# Patient Record
Sex: MALE | Race: WHITE | NOT HISPANIC OR LATINO | Employment: OTHER | URBAN - METROPOLITAN AREA
[De-identification: names, ages, dates, MRNs, and addresses within clinical notes are randomized per-mention and may not be internally consistent; named-entity substitution may affect disease eponyms.]

---

## 2017-03-13 PROBLEM — N18.30 CKD STAGE 3 SECONDARY TO DIABETES: Status: ACTIVE | Noted: 2017-03-13

## 2017-03-13 PROBLEM — E11.22 CKD STAGE 3 SECONDARY TO DIABETES: Status: ACTIVE | Noted: 2017-03-13

## 2017-10-27 ENCOUNTER — OFFICE VISIT (OUTPATIENT)
Dept: PODIATRY | Facility: CLINIC | Age: 82
End: 2017-10-27
Payer: COMMERCIAL

## 2017-10-27 VITALS — HEIGHT: 68 IN | WEIGHT: 167.13 LBS | BODY MASS INDEX: 25.33 KG/M2

## 2017-10-27 DIAGNOSIS — M21.6X9 EQUINUS DEFORMITY OF FOOT: ICD-10-CM

## 2017-10-27 DIAGNOSIS — E11.22 TYPE 2 DIABETES MELLITUS WITH STAGE 3 CHRONIC KIDNEY DISEASE, WITHOUT LONG-TERM CURRENT USE OF INSULIN: Primary | ICD-10-CM

## 2017-10-27 DIAGNOSIS — N18.30 TYPE 2 DIABETES MELLITUS WITH STAGE 3 CHRONIC KIDNEY DISEASE, WITHOUT LONG-TERM CURRENT USE OF INSULIN: Primary | ICD-10-CM

## 2017-10-27 DIAGNOSIS — M72.2 PLANTAR FASCIITIS: ICD-10-CM

## 2017-10-27 PROCEDURE — 99999 PR PBB SHADOW E&M-NEW PATIENT-LVL III: CPT | Mod: PBBFAC,,, | Performed by: PODIATRIST

## 2017-10-27 PROCEDURE — 11721 DEBRIDE NAIL 6 OR MORE: CPT | Mod: S$GLB,,, | Performed by: PODIATRIST

## 2017-10-27 PROCEDURE — 99204 OFFICE O/P NEW MOD 45 MIN: CPT | Mod: 25,S$GLB,, | Performed by: PODIATRIST

## 2017-10-27 NOTE — PATIENT INSTRUCTIONS
- Perform stretching exercises, see handout for details, to address tightness of calf muscles.      - Recommend wearing supportive shoes or orthopedic sandals only.  Avoid barefoot walking, flip flops, and Crocs, as this will exacerbate current symptoms.      - Recommend wearing a pair of OTC insoles.    - Recommend icing the affected heel a minimum of 20 minutes daily.    - Avoid high impact activities such as squatting, stooping, and running as these activities will exacerbate symptoms.      - May consider applying a topical analgesic (Aspercream or Salonpas) to help with pain symptoms.

## 2017-10-27 NOTE — PROGRESS NOTES
Subjective:      Patient ID: Yayo Daniels is a 82 y.o. male.    Chief Complaint: Heel Pain (left) and Diabetes Mellitus      Yayo is a 82 y.o. male who presents to the clinic for evaluation and treatment of diabetic feet. Yayo has a past medical history of Abnormality of gait; Dermatophytosis of nail; Hypothyroidism; Memory loss; Other and unspecified hyperlipidemia; Pancytopenia; Type II or unspecified type diabetes mellitus without mention of complication, uncontrolled; and Vascular dementia, uncomplicated. The majority of the patient's past history was provided by his wife on account of his dementia.  States that he has been complaining of Lt. Heel pain for the past several weeks.  Describes pain as sharp and localized to the central portion of the heel.  Symptoms are exacerbated with prolonged weight bearing and alleviated with rest.  Has been wearing shoes with cushioned heel soles to offload the affected area with minimal improvement in symptoms.  Denies trauma to the affected heel.  Denies any additional pedal complaints.      PCP: MARYAM Patrick Jr, MD    Date Last Seen by PCP: 9/14/17    Hemoglobin A1C   Date Value Ref Range Status   09/07/2017 7.8 (H) 0.0 - 5.6 % Final     Comment:     Reference Interval:  5.0 - 5.6 Normal   5.7 - 6.4 High Risk   > 6.5 Diabetic    Hgb A1c results are standardized based on the (NGSP) National   Glycohemoglobin Standardization Program.    Hemoglobin A1C levels are related to mean serum/plasma glucose   during the preceding 2-3 months.        03/08/2017 7.6 (H) 0.0 - 5.6 % Final     Comment:     Reference Interval:  5.0 - 5.6 Normal   5.7 - 6.4 High Risk   > 6.5 Diabetic    Hgb A1c results are standardized based on the (NGSP) National   Glycohemoglobin Standardization Program.    Hemoglobin A1C levels are related to mean serum/plasma glucose   during the preceding 2-3 months.        09/02/2016 7.6 (H) 0.0 - 5.6 % Final     Comment:     Reference Interval:  5.0 -  5.6 Normal   5.7 - 6.4 High Risk   > 6.5 Diabetic    Hgb A1c results are standardized based on the (NGSP) National   Glycohemoglobin Standardization Program.    Hemoglobin A1C levels are related to mean serum/plasma glucose   during the preceding 2-3 months.                    Past Medical History:   Diagnosis Date    Abnormality of gait     Dermatophytosis of nail     Hypothyroidism     Memory loss     Other and unspecified hyperlipidemia     Pancytopenia     Type II or unspecified type diabetes mellitus without mention of complication, uncontrolled     Vascular dementia, uncomplicated     ateriosclerotic dementia       Past Surgical History:   Procedure Laterality Date    BONE MARROW BIOPSY      2004    lens implants       dr juárez 205    SQUAMOUS CELL CARCINOMA EXCISION      left cheek   2004       Family History   Problem Relation Age of Onset    Cancer Father        Social History     Social History    Marital status:      Spouse name: N/A    Number of children: N/A    Years of education: N/A     Social History Main Topics    Smoking status: Never Smoker    Smokeless tobacco: Never Used    Alcohol use No    Drug use: Unknown    Sexual activity: Not Currently     Other Topics Concern    None     Social History Narrative    None       Current Outpatient Prescriptions   Medication Sig Dispense Refill    aspirin (ECOTRIN) 81 MG EC tablet Take 81 mg by mouth once daily.      calcium-vitamin D3 (CALCIUM 500 + D) 500 mg(1,250mg) -200 unit per tablet Take 1 tablet by mouth once daily.      cetirizine (ZYRTEC) 5 MG tablet Take 5 mg by mouth once daily.      ergocalciferol (ERGOCALCIFEROL) 50,000 unit Cap Take 50,000 Units by mouth every 7 days.      galantamine (RAZADYNE ER) 16 MG 24 hr capsule Take 16 mg by mouth daily with breakfast.      levothyroxine (SYNTHROID) 75 MCG tablet TAKE 1 TABLET BY MOUTH ONCE DAILY 90 tablet 3    lorazepam (ATIVAN) 0.5 MG tablet TAKE 1 TABLET BY MOUTH  EVERY MORNING AND 1 TO 2 TABLETS BY MOUTH EVERY EVENING AS NEEDED FOR AGITATION 90 tablet 5    naproxen sodium (ANAPROX) 220 MG tablet Take 220 mg by mouth 2 (two) times daily with meals.      pravastatin (PRAVACHOL) 80 MG tablet Take 80 mg by mouth once daily. Pt takes 40 mg      quetiapine (SEROQUEL) 50 MG tablet Take 50 mg by mouth every evening.      SAXagliptin (ONGLYZA) 5 mg Tab tablet Take by mouth once daily.      FLUZONE HIGH-DOSE 2016-17, PF, 180 mcg/0.5 mL Syrg ADM 0.5ML IM UTD  0    FLUZONE HIGH-DOSE 2017-18, PF, 180 mcg/0.5 mL vaccine ADM 0.5ML IM UTD  0    TRUEPLUS LANCETS 33 gauge Misc USE TO TEST BLOOD GLUCOSE ONCE D  3    TRUETEST TEST STRIPS Strp USE TO TAKE BLOOD SUGAR DAILY 100 strip 11     No current facility-administered medications for this visit.        Review of patient's allergies indicates:   Allergen Reactions    Aricept [donepezil] Other (See Comments)    Glimepiride Other (See Comments)    Metformin Other (See Comments)    Namenda [memantine]     Pravachol [pravastatin] Other (See Comments)    Zocor [simvastatin] Other (See Comments)         Review of Systems   Constitution: Negative for chills and fever.   Cardiovascular: Negative for claudication and leg swelling.   Skin: Positive for color change and nail changes.   Musculoskeletal: Positive for myalgias. Negative for joint pain.   Neurological: Negative for numbness and paresthesias.   Psychiatric/Behavioral: Negative for altered mental status.           Objective:      Physical Exam   Constitutional: He is oriented to person, place, and time. He appears well-developed and well-nourished. No distress.   Eyes: Conjunctivae are normal.   Cardiovascular:   Pulses:       Dorsalis pedis pulses are 2+ on the right side, and 2+ on the left side.        Posterior tibial pulses are 2+ on the right side, and 2+ on the left side.   CFT <3 seconds bilateral.  Pedal hair growth present bilateral.   Varicosities noted bilateral.   Mild nonpitting edema noted bilateral.  Toes are warm to touch bilateral.     Musculoskeletal: He exhibits edema and tenderness.   Muscle strength 5/5 in all muscle groups bilateral.  No tenderness nor crepitation with ROM of foot/ankle joints bilateral.  Pain with palpation of the Lt. Plantar central heel.  (-) heel squeeze test bilateral.    Bilateral pes planus foot type.  Bilateral gastrocnemius equinus.   Neurological: He is alert and oriented to person, place, and time. He has normal strength. No sensory deficit. He displays Babinski's sign on the right side.   Protective sensation per Denair-Demarcus monofilament intact bilateral.    Vibratory sensation intact bilateral.    Light touch intact bilateral.   Skin: Skin is warm, dry and intact. Capillary refill takes less than 2 seconds. No abrasion, no bruising, no burn, no ecchymosis, no laceration, no lesion, no petechiae and no rash noted. He is not diaphoretic. No erythema. No pallor.   Pedal skin has age appropriate turgor, temperature, and texture bilateral.  Toenails x 10 appear thickened by 2 mm's, elongated by 4 mm's, and discolored with subungual debris.   Examination of the skin reveals no evidence of significant maceration, rashes, open lesions, suspicious appearing nevi or other concerning lesions.              Assessment:       Encounter Diagnoses   Name Primary?    Plantar fasciitis     Equinus deformity of foot     Type 2 diabetes mellitus with stage 3 chronic kidney disease, without long-term current use of insulin Yes         Plan:       Yayo was seen today for heel pain and diabetes mellitus.    Diagnoses and all orders for this visit:    Type 2 diabetes mellitus with stage 3 chronic kidney disease, without long-term current use of insulin    Plantar fasciitis    Equinus deformity of foot      I counseled the patient on his conditions, their implications and medical management.    - Discussed performing stretching exercises to address  equinus.      - Recommend wearing supportive shoes and to avoid barefoot walking, flip flops, and Crocs, as this will exacerbate current symptoms.      - Recommend wearing a pair of OTC insoles.  Given both verbal and written information regarding this.    - Recommend icing the affected heel a minimum of 20 minutes daily.    - Discussed avoidance of high impact activities such as squatting, stooping, and running as these activities will exacerbate symptoms.      - May consider applying a topical analgesic (Aspercream or Salonpas) to help with pain symptoms.      - Shoe inspection. Diabetic Foot Education. Patient reminded of the importance of good nutrition and blood sugar control to help prevent podiatric complications of diabetes. Patient instructed on proper foot hygeine. We discussed wearing proper shoe gear, daily foot inspections, never walking without protective shoe gear, never putting sharp instruments to feet    - With patient's permission, nails were aggressively reduced and debrided x 10 to their soft tissue attachment mechanically and with electric , removing all offending nail and debris. Patient relates relief following the procedure. He will continue to monitor the areas daily, inspect his feet, wear protective shoe gear when ambulatory, moisturizer to maintain skin integrity and follow in this office in approximately 12 months, sooner p.r.n.    Return in about 1 year (around 10/27/2018).    Lucho Portillo DPM

## 2018-11-14 PROBLEM — E03.9 HYPOTHYROIDISM: Status: ACTIVE | Noted: 2018-11-14

## 2018-11-14 PROBLEM — D61.818 PANCYTOPENIA: Status: ACTIVE | Noted: 2018-11-14

## 2019-01-01 ENCOUNTER — OUTSIDE PLACE OF SERVICE (OUTPATIENT)
Dept: FAMILY MEDICINE | Facility: CLINIC | Age: 84
End: 2019-01-01
Payer: COMMERCIAL

## 2019-01-01 ENCOUNTER — HOSPITAL ENCOUNTER (OUTPATIENT)
Facility: HOSPITAL | Age: 84
Discharge: OTHER INSTITUTION | End: 2019-08-20
Attending: EMERGENCY MEDICINE | Admitting: FAMILY MEDICINE
Payer: COMMERCIAL

## 2019-01-01 ENCOUNTER — HOSPITAL ENCOUNTER (EMERGENCY)
Facility: HOSPITAL | Age: 84
Discharge: HOME | End: 2019-07-04
Attending: SURGERY
Payer: COMMERCIAL

## 2019-01-01 VITALS
TEMPERATURE: 97 F | WEIGHT: 177.94 LBS | SYSTOLIC BLOOD PRESSURE: 120 MMHG | HEART RATE: 69 BPM | DIASTOLIC BLOOD PRESSURE: 73 MMHG | OXYGEN SATURATION: 94 % | BODY MASS INDEX: 26.97 KG/M2 | RESPIRATION RATE: 18 BRPM | HEIGHT: 68 IN

## 2019-01-01 VITALS
TEMPERATURE: 98 F | OXYGEN SATURATION: 97 % | HEART RATE: 78 BPM | SYSTOLIC BLOOD PRESSURE: 142 MMHG | DIASTOLIC BLOOD PRESSURE: 63 MMHG

## 2019-01-01 DIAGNOSIS — R11.10 VOMITING: ICD-10-CM

## 2019-01-01 DIAGNOSIS — K85.90 PANCREATITIS, ACUTE: ICD-10-CM

## 2019-01-01 DIAGNOSIS — K62.89 PROCTITIS: Primary | ICD-10-CM

## 2019-01-01 DIAGNOSIS — S60.811A ABRASION OF RIGHT WRIST, INITIAL ENCOUNTER: ICD-10-CM

## 2019-01-01 DIAGNOSIS — N18.30 CKD STAGE 3 SECONDARY TO DIABETES: Chronic | ICD-10-CM

## 2019-01-01 DIAGNOSIS — S61.412A LACERATION OF LEFT HAND, FOREIGN BODY PRESENCE UNSPECIFIED, INITIAL ENCOUNTER: Primary | ICD-10-CM

## 2019-01-01 DIAGNOSIS — K85.90 PANCREATITIS: ICD-10-CM

## 2019-01-01 DIAGNOSIS — E11.22 CKD STAGE 3 SECONDARY TO DIABETES: Chronic | ICD-10-CM
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
## 2019-01-01 DIAGNOSIS — S80.02XA CONTUSION OF LEFT KNEE, INITIAL ENCOUNTER: ICD-10-CM

## 2019-01-01 LAB
ALBUMIN SERPL BCP-MCNC: 4 G/DL (ref 3.5–5.2)
ALP SERPL-CCNC: 108 U/L (ref 55–135)
ALT SERPL W/O P-5'-P-CCNC: 16 U/L (ref 10–44)
ANION GAP SERPL CALC-SCNC: 10 MMOL/L (ref 8–16)
ANION GAP SERPL CALC-SCNC: 8 MMOL/L (ref 8–16)
ANION GAP SERPL CALC-SCNC: 9 MMOL/L (ref 8–16)
APTT BLDCRRT: 28.9 SEC (ref 21–32)
AST SERPL-CCNC: 15 U/L (ref 10–40)
BASOPHILS # BLD AUTO: 0.07 K/UL (ref 0–0.2)
BASOPHILS NFR BLD: 0.8 % (ref 0–1.9)
BILIRUB SERPL-MCNC: 0.7 MG/DL (ref 0.1–1)
BILIRUB UR QL STRIP: NEGATIVE
BNP SERPL-MCNC: 12 PG/ML (ref 0–99)
BUN SERPL-MCNC: 16 MG/DL (ref 8–23)
BUN SERPL-MCNC: 17 MG/DL (ref 8–23)
BUN SERPL-MCNC: 23 MG/DL (ref 8–23)
CALCIUM SERPL-MCNC: 8.6 MG/DL (ref 8.7–10.5)
CALCIUM SERPL-MCNC: 9.3 MG/DL (ref 8.7–10.5)
CALCIUM SERPL-MCNC: 9.4 MG/DL (ref 8.7–10.5)
CHLORIDE SERPL-SCNC: 102 MMOL/L (ref 95–110)
CHLORIDE SERPL-SCNC: 104 MMOL/L (ref 95–110)
CHLORIDE SERPL-SCNC: 98 MMOL/L (ref 95–110)
CLARITY UR: CLEAR
CO2 SERPL-SCNC: 25 MMOL/L (ref 23–29)
CO2 SERPL-SCNC: 27 MMOL/L (ref 23–29)
CO2 SERPL-SCNC: 29 MMOL/L (ref 23–29)
COLOR UR: YELLOW
CREAT SERPL-MCNC: 1.1 MG/DL (ref 0.5–1.4)
CREAT SERPL-MCNC: 1.3 MG/DL (ref 0.5–1.4)
CREAT SERPL-MCNC: 1.3 MG/DL (ref 0.5–1.4)
DIFFERENTIAL METHOD: ABNORMAL
EOSINOPHIL # BLD AUTO: 0.1 K/UL (ref 0–0.5)
EOSINOPHIL NFR BLD: 0.7 % (ref 0–8)
ERYTHROCYTE [DISTWIDTH] IN BLOOD BY AUTOMATED COUNT: 12.5 % (ref 11.5–14.5)
EST. GFR  (AFRICAN AMERICAN): 58 ML/MIN/1.73 M^2
EST. GFR  (AFRICAN AMERICAN): 58 ML/MIN/1.73 M^2
EST. GFR  (AFRICAN AMERICAN): >60 ML/MIN/1.73 M^2
EST. GFR  (NON AFRICAN AMERICAN): 50 ML/MIN/1.73 M^2
EST. GFR  (NON AFRICAN AMERICAN): 50 ML/MIN/1.73 M^2
EST. GFR  (NON AFRICAN AMERICAN): >60 ML/MIN/1.73 M^2
ESTIMATED AVG GLUCOSE: 240 MG/DL (ref 68–131)
GLUCOSE SERPL-MCNC: 181 MG/DL (ref 70–110)
GLUCOSE SERPL-MCNC: 188 MG/DL (ref 70–110)
GLUCOSE SERPL-MCNC: 212 MG/DL (ref 70–110)
GLUCOSE UR QL STRIP: ABNORMAL
HBA1C MFR BLD HPLC: 10 % (ref 4–5.6)
HCT VFR BLD AUTO: 43 % (ref 40–54)
HGB BLD-MCNC: 14.6 G/DL (ref 14–18)
HGB UR QL STRIP: NEGATIVE
INFLUENZA A, MOLECULAR: NEGATIVE
INFLUENZA B, MOLECULAR: NEGATIVE
INR PPP: 1 (ref 0.8–1.2)
KETONES UR QL STRIP: NEGATIVE
LACTATE SERPL-SCNC: 1.8 MMOL/L (ref 0.5–2.2)
LEUKOCYTE ESTERASE UR QL STRIP: NEGATIVE
LIPASE SERPL-CCNC: 255 U/L (ref 4–60)
LIPASE SERPL-CCNC: 64 U/L (ref 4–60)
LYMPHOCYTES # BLD AUTO: 0.6 K/UL (ref 1–4.8)
LYMPHOCYTES NFR BLD: 6.5 % (ref 18–48)
MCH RBC QN AUTO: 31.6 PG (ref 27–31)
MCHC RBC AUTO-ENTMCNC: 34 G/DL (ref 32–36)
MCV RBC AUTO: 93 FL (ref 82–98)
MONOCYTES # BLD AUTO: 0.6 K/UL (ref 0.3–1)
MONOCYTES NFR BLD: 7.1 % (ref 4–15)
NEUTROPHILS # BLD AUTO: 7.2 K/UL (ref 1.8–7.7)
NEUTROPHILS NFR BLD: 84.9 % (ref 38–73)
NITRITE UR QL STRIP: NEGATIVE
PH UR STRIP: 6 [PH] (ref 5–8)
PLATELET # BLD AUTO: 128 K/UL (ref 150–350)
PMV BLD AUTO: 9.9 FL (ref 9.2–12.9)
POCT GLUCOSE: 148 MG/DL (ref 70–110)
POCT GLUCOSE: 163 MG/DL (ref 70–110)
POCT GLUCOSE: 176 MG/DL (ref 70–110)
POCT GLUCOSE: 202 MG/DL (ref 70–110)
POCT GLUCOSE: 227 MG/DL (ref 70–110)
POCT GLUCOSE: 249 MG/DL (ref 70–110)
POCT GLUCOSE: 279 MG/DL (ref 70–110)
POTASSIUM SERPL-SCNC: 3.8 MMOL/L (ref 3.5–5.1)
POTASSIUM SERPL-SCNC: 4.2 MMOL/L (ref 3.5–5.1)
POTASSIUM SERPL-SCNC: 4.4 MMOL/L (ref 3.5–5.1)
PROT SERPL-MCNC: 6.9 G/DL (ref 6–8.4)
PROT UR QL STRIP: NEGATIVE
PROTHROMBIN TIME: 10.5 SEC (ref 9–12.5)
RBC # BLD AUTO: 4.62 M/UL (ref 4.6–6.2)
SODIUM SERPL-SCNC: 135 MMOL/L (ref 136–145)
SODIUM SERPL-SCNC: 138 MMOL/L (ref 136–145)
SODIUM SERPL-SCNC: 139 MMOL/L (ref 136–145)
SP GR UR STRIP: 1.02 (ref 1–1.03)
SPECIMEN SOURCE: NORMAL
TROPONIN I SERPL DL<=0.01 NG/ML-MCNC: <0.006 NG/ML (ref 0–0.03)
URN SPEC COLLECT METH UR: ABNORMAL
UROBILINOGEN UR STRIP-ACNC: NEGATIVE EU/DL
WBC # BLD AUTO: 8.51 K/UL (ref 3.9–12.7)

## 2019-01-01 PROCEDURE — 90715 TDAP VACCINE 7 YRS/> IM: CPT | Mod: ER | Performed by: SURGERY

## 2019-01-01 PROCEDURE — 97530 THERAPEUTIC ACTIVITIES: CPT

## 2019-01-01 PROCEDURE — 84484 ASSAY OF TROPONIN QUANT: CPT

## 2019-01-01 PROCEDURE — 93005 ELECTROCARDIOGRAM TRACING: CPT

## 2019-01-01 PROCEDURE — 85610 PROTHROMBIN TIME: CPT

## 2019-01-01 PROCEDURE — S5571 INSULIN DISPOS PEN 3 ML: HCPCS | Performed by: NURSE PRACTITIONER

## 2019-01-01 PROCEDURE — 25000003 PHARM REV CODE 250: Performed by: NURSE PRACTITIONER

## 2019-01-01 PROCEDURE — 63600175 PHARM REV CODE 636 W HCPCS: Performed by: NURSE PRACTITIONER

## 2019-01-01 PROCEDURE — 36415 COLL VENOUS BLD VENIPUNCTURE: CPT

## 2019-01-01 PROCEDURE — 12002 RPR S/N/AX/GEN/TRNK2.6-7.5CM: CPT | Mod: ER

## 2019-01-01 PROCEDURE — 99308 SBSQ NF CARE LOW MDM 20: CPT | Mod: ,,, | Performed by: FAMILY MEDICINE

## 2019-01-01 PROCEDURE — G0378 HOSPITAL OBSERVATION PER HR: HCPCS

## 2019-01-01 PROCEDURE — 82962 GLUCOSE BLOOD TEST: CPT

## 2019-01-01 PROCEDURE — 96372 THER/PROPH/DIAG INJ SC/IM: CPT | Mod: 59 | Performed by: EMERGENCY MEDICINE

## 2019-01-01 PROCEDURE — 97161 PT EVAL LOW COMPLEX 20 MIN: CPT

## 2019-01-01 PROCEDURE — 96361 HYDRATE IV INFUSION ADD-ON: CPT

## 2019-01-01 PROCEDURE — 99499 NO LOS: ICD-10-PCS | Mod: S$GLB,,, | Performed by: FAMILY MEDICINE

## 2019-01-01 PROCEDURE — 85730 THROMBOPLASTIN TIME PARTIAL: CPT

## 2019-01-01 PROCEDURE — 80048 BASIC METABOLIC PNL TOTAL CA: CPT

## 2019-01-01 PROCEDURE — 25000003 PHARM REV CODE 250: Performed by: FAMILY MEDICINE

## 2019-01-01 PROCEDURE — 83690 ASSAY OF LIPASE: CPT

## 2019-01-01 PROCEDURE — 96375 TX/PRO/DX INJ NEW DRUG ADDON: CPT | Performed by: EMERGENCY MEDICINE

## 2019-01-01 PROCEDURE — 63600175 PHARM REV CODE 636 W HCPCS: Mod: ER | Performed by: SURGERY

## 2019-01-01 PROCEDURE — 85025 COMPLETE CBC W/AUTO DIFF WBC: CPT

## 2019-01-01 PROCEDURE — 99307 SBSQ NF CARE SF MDM 10: CPT | Mod: ,,, | Performed by: FAMILY MEDICINE

## 2019-01-01 PROCEDURE — 99307 PR NURSING FAC CARE, SUBSEQ, IMPROVING: ICD-10-PCS | Mod: ,,, | Performed by: FAMILY MEDICINE

## 2019-01-01 PROCEDURE — 93010 EKG 12-LEAD: ICD-10-PCS | Mod: ,,, | Performed by: INTERNAL MEDICINE

## 2019-01-01 PROCEDURE — 63600175 PHARM REV CODE 636 W HCPCS: Performed by: EMERGENCY MEDICINE

## 2019-01-01 PROCEDURE — 87502 INFLUENZA DNA AMP PROBE: CPT

## 2019-01-01 PROCEDURE — 97116 GAIT TRAINING THERAPY: CPT

## 2019-01-01 PROCEDURE — 83605 ASSAY OF LACTIC ACID: CPT

## 2019-01-01 PROCEDURE — 25500020 PHARM REV CODE 255: Performed by: EMERGENCY MEDICINE

## 2019-01-01 PROCEDURE — 81003 URINALYSIS AUTO W/O SCOPE: CPT

## 2019-01-01 PROCEDURE — 93010 ELECTROCARDIOGRAM REPORT: CPT | Mod: ,,, | Performed by: INTERNAL MEDICINE

## 2019-01-01 PROCEDURE — 96374 THER/PROPH/DIAG INJ IV PUSH: CPT | Mod: 59

## 2019-01-01 PROCEDURE — 99308 PR NURSING FAC CARE, SUBSEQ, MINOR COMPLIC: ICD-10-PCS | Mod: ,,, | Performed by: FAMILY MEDICINE

## 2019-01-01 PROCEDURE — 25000003 PHARM REV CODE 250: Mod: ER | Performed by: FAMILY MEDICINE

## 2019-01-01 PROCEDURE — 83880 ASSAY OF NATRIURETIC PEPTIDE: CPT

## 2019-01-01 PROCEDURE — 99499 UNLISTED E&M SERVICE: CPT | Mod: S$GLB,,, | Performed by: FAMILY MEDICINE

## 2019-01-01 PROCEDURE — 90471 IMMUNIZATION ADMIN: CPT | Mod: ER | Performed by: SURGERY

## 2019-01-01 PROCEDURE — 99284 EMERGENCY DEPT VISIT MOD MDM: CPT | Mod: 25,ER

## 2019-01-01 PROCEDURE — 63600175 PHARM REV CODE 636 W HCPCS: Performed by: FAMILY MEDICINE

## 2019-01-01 PROCEDURE — 94761 N-INVAS EAR/PLS OXIMETRY MLT: CPT

## 2019-01-01 PROCEDURE — 80053 COMPREHEN METABOLIC PANEL: CPT

## 2019-01-01 PROCEDURE — 99285 EMERGENCY DEPT VISIT HI MDM: CPT | Mod: 25

## 2019-01-01 PROCEDURE — 83036 HEMOGLOBIN GLYCOSYLATED A1C: CPT

## 2019-01-01 RX ORDER — ACETAMINOPHEN 325 MG/1
650 TABLET ORAL EVERY 4 HOURS PRN
Status: DISCONTINUED | OUTPATIENT
Start: 2019-01-01 | End: 2019-01-01 | Stop reason: HOSPADM

## 2019-01-01 RX ORDER — SODIUM CHLORIDE 9 MG/ML
1000 INJECTION, SOLUTION INTRAVENOUS
Status: COMPLETED | OUTPATIENT
Start: 2019-01-01 | End: 2019-01-01

## 2019-01-01 RX ORDER — GUAIFENESIN 600 MG/1
1200 TABLET, EXTENDED RELEASE ORAL DAILY
Status: DISCONTINUED | OUTPATIENT
Start: 2019-01-01 | End: 2019-01-01 | Stop reason: HOSPADM

## 2019-01-01 RX ORDER — IBUPROFEN 200 MG
16 TABLET ORAL
Status: DISCONTINUED | OUTPATIENT
Start: 2019-01-01 | End: 2019-01-01 | Stop reason: HOSPADM

## 2019-01-01 RX ORDER — POLYETHYLENE GLYCOL 3350 17 G/17G
17 POWDER, FOR SOLUTION ORAL DAILY
Qty: 7 EACH | Refills: 0 | Status: SHIPPED | OUTPATIENT
Start: 2019-01-01 | End: 2019-01-01

## 2019-01-01 RX ORDER — IBUPROFEN 200 MG
24 TABLET ORAL
Status: DISCONTINUED | OUTPATIENT
Start: 2019-01-01 | End: 2019-01-01 | Stop reason: HOSPADM

## 2019-01-01 RX ORDER — FUROSEMIDE 20 MG/1
20 TABLET ORAL DAILY
COMMUNITY

## 2019-01-01 RX ORDER — QUETIAPINE FUMARATE 25 MG/1
75 TABLET, FILM COATED ORAL NIGHTLY
COMMUNITY

## 2019-01-01 RX ORDER — ONDANSETRON 8 MG/1
8 TABLET, ORALLY DISINTEGRATING ORAL EVERY 8 HOURS PRN
Status: DISCONTINUED | OUTPATIENT
Start: 2019-01-01 | End: 2019-01-01 | Stop reason: HOSPADM

## 2019-01-01 RX ORDER — INSULIN ASPART 100 [IU]/ML
1-10 INJECTION, SOLUTION INTRAVENOUS; SUBCUTANEOUS
Status: DISCONTINUED | OUTPATIENT
Start: 2019-01-01 | End: 2019-01-01 | Stop reason: HOSPADM

## 2019-01-01 RX ORDER — ZOLPIDEM TARTRATE 5 MG/1
5 TABLET ORAL NIGHTLY
COMMUNITY

## 2019-01-01 RX ORDER — MICONAZOLE NITRATE 2 %
POWDER (GRAM) TOPICAL 2 TIMES DAILY
Refills: 0 | COMMUNITY
Start: 2019-01-01

## 2019-01-01 RX ORDER — ONDANSETRON 2 MG/ML
4 INJECTION INTRAMUSCULAR; INTRAVENOUS EVERY 8 HOURS PRN
Status: DISCONTINUED | OUTPATIENT
Start: 2019-01-01 | End: 2019-01-01 | Stop reason: HOSPADM

## 2019-01-01 RX ORDER — AMOXICILLIN 250 MG
1 CAPSULE ORAL 2 TIMES DAILY
Status: DISCONTINUED | OUTPATIENT
Start: 2019-01-01 | End: 2019-01-01 | Stop reason: HOSPADM

## 2019-01-01 RX ORDER — QUETIAPINE FUMARATE 25 MG/1
50 TABLET, FILM COATED ORAL DAILY
COMMUNITY

## 2019-01-01 RX ORDER — MEMANTINE HYDROCHLORIDE 5 MG/1
5 TABLET ORAL 2 TIMES DAILY
COMMUNITY

## 2019-01-01 RX ORDER — PRAVASTATIN SODIUM 40 MG/1
40 TABLET ORAL DAILY
COMMUNITY

## 2019-01-01 RX ORDER — MICONAZOLE NITRATE 2 %
POWDER (GRAM) TOPICAL 2 TIMES DAILY
Status: DISCONTINUED | OUTPATIENT
Start: 2019-01-01 | End: 2019-01-01 | Stop reason: HOSPADM

## 2019-01-01 RX ORDER — CEPHALEXIN 500 MG/1
500 CAPSULE ORAL EVERY 8 HOURS
Qty: 30 CAPSULE | Refills: 0 | Status: SHIPPED | OUTPATIENT
Start: 2019-01-01 | End: 2019-01-01 | Stop reason: SDUPTHER

## 2019-01-01 RX ORDER — POLYETHYLENE GLYCOL 3350 17 G/17G
17 POWDER, FOR SOLUTION ORAL DAILY
Status: DISCONTINUED | OUTPATIENT
Start: 2019-01-01 | End: 2019-01-01 | Stop reason: SDUPTHER

## 2019-01-01 RX ORDER — CEPHALEXIN 500 MG/1
500 CAPSULE ORAL
Status: COMPLETED | OUTPATIENT
Start: 2019-01-01 | End: 2019-01-01

## 2019-01-01 RX ORDER — ZOLPIDEM TARTRATE 5 MG/1
5 TABLET ORAL NIGHTLY
Status: DISCONTINUED | OUTPATIENT
Start: 2019-01-01 | End: 2019-01-01 | Stop reason: HOSPADM

## 2019-01-01 RX ORDER — GLUCAGON 1 MG
1 KIT INJECTION
Status: DISCONTINUED | OUTPATIENT
Start: 2019-01-01 | End: 2019-01-01 | Stop reason: HOSPADM

## 2019-01-01 RX ORDER — POTASSIUM CHLORIDE 750 MG/1
20 CAPSULE, EXTENDED RELEASE ORAL DAILY
COMMUNITY

## 2019-01-01 RX ORDER — POLYETHYLENE GLYCOL 3350 17 G/17G
17 POWDER, FOR SOLUTION ORAL 2 TIMES DAILY
Status: DISCONTINUED | OUTPATIENT
Start: 2019-01-01 | End: 2019-01-01 | Stop reason: HOSPADM

## 2019-01-01 RX ORDER — QUETIAPINE FUMARATE 25 MG/1
75 TABLET, FILM COATED ORAL NIGHTLY
Status: DISCONTINUED | OUTPATIENT
Start: 2019-01-01 | End: 2019-01-01 | Stop reason: HOSPADM

## 2019-01-01 RX ORDER — MEMANTINE HYDROCHLORIDE 5 MG/1
5 TABLET ORAL 2 TIMES DAILY
Status: DISCONTINUED | OUTPATIENT
Start: 2019-01-01 | End: 2019-01-01 | Stop reason: HOSPADM

## 2019-01-01 RX ORDER — CEPHALEXIN 500 MG/1
500 CAPSULE ORAL EVERY 8 HOURS
Qty: 30 CAPSULE | Refills: 0 | Status: SHIPPED | OUTPATIENT
Start: 2019-01-01 | End: 2019-01-01

## 2019-01-01 RX ORDER — QUETIAPINE FUMARATE 25 MG/1
50 TABLET, FILM COATED ORAL DAILY
Status: DISCONTINUED | OUTPATIENT
Start: 2019-01-01 | End: 2019-01-01 | Stop reason: HOSPADM

## 2019-01-01 RX ORDER — LACTULOSE 10 G/15ML
20 SOLUTION ORAL 2 TIMES DAILY
Status: DISCONTINUED | OUTPATIENT
Start: 2019-01-01 | End: 2019-01-01 | Stop reason: HOSPADM

## 2019-01-01 RX ORDER — AMOXICILLIN 250 MG
1 CAPSULE ORAL 2 TIMES DAILY
COMMUNITY
Start: 2019-01-01

## 2019-01-01 RX ORDER — SODIUM CHLORIDE 0.9 % (FLUSH) 0.9 %
10 SYRINGE (ML) INJECTION
Status: DISCONTINUED | OUTPATIENT
Start: 2019-01-01 | End: 2019-01-01 | Stop reason: HOSPADM

## 2019-01-01 RX ORDER — ONDANSETRON 2 MG/ML
4 INJECTION INTRAMUSCULAR; INTRAVENOUS
Status: COMPLETED | OUTPATIENT
Start: 2019-01-01 | End: 2019-01-01

## 2019-01-01 RX ORDER — DONEPEZIL HYDROCHLORIDE 10 MG/1
10 TABLET, FILM COATED ORAL DAILY
COMMUNITY

## 2019-01-01 RX ORDER — LACTULOSE 10 G/15ML
20 SOLUTION ORAL ONCE
Status: COMPLETED | OUTPATIENT
Start: 2019-01-01 | End: 2019-01-01

## 2019-01-01 RX ORDER — LEVOTHYROXINE SODIUM 88 UG/1
88 TABLET ORAL DAILY
Status: DISCONTINUED | OUTPATIENT
Start: 2019-01-01 | End: 2019-01-01 | Stop reason: HOSPADM

## 2019-01-01 RX ORDER — SODIUM CHLORIDE 9 MG/ML
INJECTION, SOLUTION INTRAVENOUS CONTINUOUS
Status: DISCONTINUED | OUTPATIENT
Start: 2019-01-01 | End: 2019-01-01

## 2019-01-01 RX ORDER — DONEPEZIL HYDROCHLORIDE 5 MG/1
10 TABLET, FILM COATED ORAL DAILY
Status: DISCONTINUED | OUTPATIENT
Start: 2019-01-01 | End: 2019-01-01 | Stop reason: HOSPADM

## 2019-01-01 RX ADMIN — INSULIN ASPART 2 UNITS: 100 INJECTION, SOLUTION INTRAVENOUS; SUBCUTANEOUS at 12:08

## 2019-01-01 RX ADMIN — POLYETHYLENE GLYCOL 3350 17 G: 17 POWDER, FOR SOLUTION ORAL at 09:08

## 2019-01-01 RX ADMIN — MEMANTINE 5 MG: 5 TABLET ORAL at 09:08

## 2019-01-01 RX ADMIN — QUETIAPINE 50 MG: 25 TABLET ORAL at 10:08

## 2019-01-01 RX ADMIN — SODIUM CHLORIDE 1000 ML: 0.9 INJECTION, SOLUTION INTRAVENOUS at 04:08

## 2019-01-01 RX ADMIN — CLOSTRIDIUM TETANI TOXOID ANTIGEN (FORMALDEHYDE INACTIVATED), CORYNEBACTERIUM DIPHTHERIAE TOXOID ANTIGEN (FORMALDEHYDE INACTIVATED), BORDETELLA PERTUSSIS TOXOID ANTIGEN (GLUTARALDEHYDE INACTIVATED), BORDETELLA PERTUSSIS FILAMENTOUS HEMAGGLUTININ ANTIGEN (FORMALDEHYDE INACTIVATED), BORDETELLA PERTUSSIS PERTACTIN ANTIGEN, AND BORDETELLA PERTUSSIS FIMBRIAE 2/3 ANTIGEN 0.5 ML: 5; 2; 2.5; 5; 3; 5 INJECTION, SUSPENSION INTRAMUSCULAR at 06:07

## 2019-01-01 RX ADMIN — ZOLPIDEM TARTRATE 5 MG: 5 TABLET ORAL at 09:08

## 2019-01-01 RX ADMIN — SODIUM CHLORIDE: 0.9 INJECTION, SOLUTION INTRAVENOUS at 08:08

## 2019-01-01 RX ADMIN — LACTULOSE 20 G: 20 SOLUTION ORAL at 10:08

## 2019-01-01 RX ADMIN — ONDANSETRON 4 MG: 2 INJECTION INTRAMUSCULAR; INTRAVENOUS at 11:08

## 2019-01-01 RX ADMIN — MICONAZOLE NITRATE: 20 POWDER TOPICAL at 09:08

## 2019-01-01 RX ADMIN — IOHEXOL 75 ML: 350 INJECTION, SOLUTION INTRAVENOUS at 01:08

## 2019-01-01 RX ADMIN — INSULIN ASPART 2 UNITS: 100 INJECTION, SOLUTION INTRAVENOUS; SUBCUTANEOUS at 09:08

## 2019-01-01 RX ADMIN — MICONAZOLE NITRATE: 20 POWDER TOPICAL at 10:08

## 2019-01-01 RX ADMIN — INSULIN DETEMIR 7 UNITS: 100 INJECTION, SOLUTION SUBCUTANEOUS at 09:08

## 2019-01-01 RX ADMIN — POLYETHYLENE GLYCOL 3350 17 G: 17 POWDER, FOR SOLUTION ORAL at 11:08

## 2019-01-01 RX ADMIN — LEVOTHYROXINE SODIUM 88 MCG: 88 TABLET ORAL at 10:08

## 2019-01-01 RX ADMIN — LACTULOSE 20 G: 20 SOLUTION ORAL at 09:08

## 2019-01-01 RX ADMIN — SENNOSIDES, DOCUSATE SODIUM 1 TABLET: 50; 8.6 TABLET, FILM COATED ORAL at 09:08

## 2019-01-01 RX ADMIN — DONEPEZIL HYDROCHLORIDE 10 MG: 5 TABLET, FILM COATED ORAL at 10:08

## 2019-01-01 RX ADMIN — CEPHALEXIN 500 MG: 500 CAPSULE ORAL at 07:07

## 2019-01-01 RX ADMIN — QUETIAPINE FUMARATE 75 MG: 25 TABLET ORAL at 09:08

## 2019-01-01 RX ADMIN — POLYETHYLENE GLYCOL 3350 17 G: 17 POWDER, FOR SOLUTION ORAL at 10:08

## 2019-01-01 RX ADMIN — QUETIAPINE 50 MG: 25 TABLET ORAL at 11:08

## 2019-01-01 RX ADMIN — INSULIN ASPART 4 UNITS: 100 INJECTION, SOLUTION INTRAVENOUS; SUBCUTANEOUS at 04:08

## 2019-01-01 RX ADMIN — GUAIFENESIN 1200 MG: 600 TABLET, EXTENDED RELEASE ORAL at 04:08

## 2019-01-01 RX ADMIN — LORAZEPAM 2 MG: 2 INJECTION INTRAMUSCULAR; INTRAVENOUS at 01:08

## 2019-01-01 RX ADMIN — MEMANTINE 5 MG: 5 TABLET ORAL at 11:08

## 2019-01-01 RX ADMIN — MEMANTINE 5 MG: 5 TABLET ORAL at 10:08

## 2019-01-01 RX ADMIN — SENNOSIDES, DOCUSATE SODIUM 1 TABLET: 50; 8.6 TABLET, FILM COATED ORAL at 10:08

## 2019-02-27 PROBLEM — J30.1 CHRONIC SEASONAL ALLERGIC RHINITIS DUE TO POLLEN: Status: ACTIVE | Noted: 2019-02-27

## 2019-02-27 PROBLEM — E55.9 VITAMIN D DEFICIENCY: Status: ACTIVE | Noted: 2019-02-27

## 2019-03-07 ENCOUNTER — OUTSIDE PLACE OF SERVICE (OUTPATIENT)
Dept: FAMILY MEDICINE | Facility: CLINIC | Age: 84
End: 2019-03-07
Payer: COMMERCIAL

## 2019-03-07 PROCEDURE — 99305 PR NURSING FACILITY CARE, INIT, MOD SEVERITY: ICD-10-PCS | Mod: ,,, | Performed by: FAMILY MEDICINE

## 2019-03-07 PROCEDURE — 99305 1ST NF CARE MODERATE MDM 35: CPT | Mod: ,,, | Performed by: FAMILY MEDICINE

## 2019-03-20 ENCOUNTER — OUTSIDE PLACE OF SERVICE (OUTPATIENT)
Dept: FAMILY MEDICINE | Facility: CLINIC | Age: 84
End: 2019-03-20
Payer: COMMERCIAL

## 2019-03-20 PROCEDURE — 99499 UNLISTED E&M SERVICE: CPT | Mod: S$GLB,,, | Performed by: FAMILY MEDICINE

## 2019-03-20 PROCEDURE — 99499 NO LOS: ICD-10-PCS | Mod: S$GLB,,, | Performed by: FAMILY MEDICINE

## 2019-04-17 ENCOUNTER — OUTSIDE PLACE OF SERVICE (OUTPATIENT)
Dept: FAMILY MEDICINE | Facility: CLINIC | Age: 84
End: 2019-04-17
Payer: COMMERCIAL

## 2019-04-17 PROCEDURE — 99307 PR NURSING FAC CARE, SUBSEQ, IMPROVING: ICD-10-PCS | Mod: ,,, | Performed by: FAMILY MEDICINE

## 2019-04-17 PROCEDURE — 99307 SBSQ NF CARE SF MDM 10: CPT | Mod: ,,, | Performed by: FAMILY MEDICINE

## 2019-05-19 PROBLEM — R26.9 ABNORMALITY OF GAIT: Status: ACTIVE | Noted: 2019-01-01

## 2019-07-04 NOTE — ED PROVIDER NOTES
Encounter Date: 7/4/2019       History     Chief Complaint   Patient presents with    Fall     lost balance and fell at [facility] home. complains of left knee pain, swelling noted. skin tear to left hand and abrasion to forehead where he pulled the table down on himself when he fell. no loc. hx dementia, oriented to his normal status     Patient was sent from the nursing home for evaluation of a fall.  He complains of left knee pain.  he has abrasions/ laceration over dorsum hand on the left with abrasions on the right wrist and a small abrasion right for there is no LOC he is oriented to his baseline status.    The history is provided by the patient.   Fall   The accident occurred just prior to arrival. The fall occurred while standing. He fell from a height of 3 to 5 ft. He landed on a hard floor. The point of impact was the face, left wrist and right wrist. The pain is present in the left knee. The pain is at a severity of 2/10. He was ambulatory at the scene. There was no entrapment after the fall. There was no drug use involved in the accident.     Review of patient's allergies indicates:   Allergen Reactions    Aricept [donepezil] Other (See Comments)    Glimepiride Other (See Comments)    Metformin Other (See Comments)    Namenda [memantine]     Pravachol [pravastatin] Other (See Comments)    Zocor [simvastatin] Other (See Comments)     Past Medical History:   Diagnosis Date    Abnormality of gait     Dermatophytosis of nail     Hypothyroidism     Memory loss     Other and unspecified hyperlipidemia     Pancytopenia     Type II or unspecified type diabetes mellitus without mention of complication, uncontrolled     Vascular dementia, uncomplicated     ateriosclerotic dementia     Past Surgical History:   Procedure Laterality Date    BONE MARROW BIOPSY      2004    lens implants       dr juárez 205    SQUAMOUS CELL CARCINOMA EXCISION      left cheek   2004     Family History   Problem Relation Age  of Onset    Cancer Father      Social History     Tobacco Use    Smoking status: Never Smoker    Smokeless tobacco: Never Used   Substance Use Topics    Alcohol use: No    Drug use: No     Review of Systems   Constitutional: Negative.    HENT: Negative.    Eyes: Negative.    Respiratory: Negative.    Cardiovascular: Negative.    Gastrointestinal: Negative.    Endocrine: Negative.    Genitourinary: Negative.    Musculoskeletal: Positive for joint swelling.   Skin: Positive for wound.   Allergic/Immunologic: Negative.    Neurological: Negative.    Hematological: Negative.    Psychiatric/Behavioral: Negative.        Physical Exam     Initial Vitals [07/04/19 1745]   BP Pulse Resp Temp SpO2   133/82 78 -- 98 °F (36.7 °C) 100 %      MAP       --         Physical Exam    Nursing note and vitals reviewed.  Constitutional: He appears well-developed and well-nourished.   HENT:   Head:       Abrasion right forehead superficial   Eyes: Conjunctivae are normal.   Neck: Normal range of motion.   Cardiovascular: Normal rate, regular rhythm, normal heart sounds and intact distal pulses.   Pulmonary/Chest: Breath sounds normal.   Abdominal: Soft.   Musculoskeletal:        Arms:       Hands:       Legs:   4 Cm avulsed flap   Neurological: He is alert and oriented to person, place, and time. He has normal strength. He displays normal reflexes. No cranial nerve deficit or sensory deficit. GCS score is 15. GCS eye subscore is 4. GCS verbal subscore is 5. GCS motor subscore is 6.   Skin: Capillary refill takes less than 2 seconds.         ED Course   Lac Repair  Date/Time: 7/4/2019 6:10 PM  Performed by: ROBBIE Mccord III, MD  Authorized by: ROBBIE Mccord III, MD   Consent Done: Not Needed  Body area: upper extremity  Location details: left hand  Laceration length: 4 cm  Foreign bodies: no foreign bodies  Tendon involvement: none  Nerve involvement: none  Vascular damage: no  Patient sedated: no  Irrigation solution:  saline  Skin closure: glue  Approximation: loose  Approximation difficulty: simple  Patient tolerance: Patient tolerated the procedure well with no immediate complications        Labs Reviewed - No data to display       Imaging Results    None          Medical Decision Making:   Initial Assessment:   4 cm laceration left hand repair with abrasion right wrist and left knee contusion  ED Management:  Pending xrays                      Clinical Impression:       ICD-10-CM ICD-9-CM   1. Laceration of left hand, foreign body presence unspecified, initial encounter S61.412A 882.0   2. Fall W19.XXXA E888.9   3. Abrasion of right wrist, initial encounter S60.811A 913.0   4. Contusion of left knee, initial encounter S80.02XA 924.11         Disposition:   Disposition: Discharged  Condition: Stable                        ROBBIE Mccord III, MD  07/06/19 0754

## 2019-07-04 NOTE — ED NOTES
Bed: Exam 08  Expected date: 7/4/19  Expected time: 5:37 PM  Means of arrival:   Comments:  [facility]

## 2019-07-05 NOTE — ED NOTES
Report given to Lilliana RN, requesting that copies of xray results be sent with patient, primary RN notified.

## 2019-08-18 PROBLEM — R11.2 NAUSEA & VOMITING: Status: ACTIVE | Noted: 2019-01-01

## 2019-08-18 PROBLEM — K62.89 PROCTITIS: Status: ACTIVE | Noted: 2019-01-01

## 2019-08-18 PROBLEM — R74.8 ELEVATED LIPASE: Status: ACTIVE | Noted: 2019-01-01

## 2019-08-18 PROBLEM — Z66 DNR (DO NOT RESUSCITATE): Status: ACTIVE | Noted: 2019-01-01

## 2019-08-18 PROBLEM — K85.90 PANCREATITIS: Status: ACTIVE | Noted: 2019-01-01

## 2019-08-18 PROBLEM — K56.41 FECAL IMPACTION IN RECTUM: Status: ACTIVE | Noted: 2019-01-01

## 2019-08-18 NOTE — H&P
[facility] Medicine  History & Physical    Patient Name: Yayo Daniels  MRN: [id number]  Admission Date: 8/18/2019  Attending Physician:  Maryana Santizo MD  Primary Care Provider: MARYAM Patrick Jr, MD         Patient information was obtained from relative(s) and ER records.     Subjective:     Principal Problem:Proctitis    Chief Complaint:   Chief Complaint   Patient presents with    Emesis     Pt presents to ED today via EMS from [facility] c/o several episodes of vomiting since released from behavior health facility for dementia         HPI: Yayo Daniels is a 84 y.o. male who  has a PMhx Diabetes Type 2, CKD 3, Vascular Dementia with gait instability, Squamous cell CA, Hypothyroidism, HLD,  And  Pancytopenia.  He is a resident of [facility], but was recently discharged from a behavior health facility for dementia.      He presented to the ED per EMS due to emesis and weakness. Patient's relative reports that the [facility] reported 3 episodes of emesis, but patient reports no vomiting today, only gagging. Relative states that his appetite is poor and is more lethargic then normal.  The patient denies any abdominal pain or nausea, but is a poor historian due to his dementia. ED workup revealed essentially normal CBC and CMP.  UA negative. His Lipase was 255.  His abdominal exam was benign with no TTP or current nausea and was stating that he was hungry and wants eat.  He had Abd CT with contrast and Abdominal US.  Both negative for pancreatitis.  No concerning findings on US. CT concerning Stercoral proctitis due to Prominent stool ball within the rectum.  Will check a Lactic Acid to r/o ischemia.  He was given a liter IVF in the ED. He will be admitted for observation for bowel care.  If he lactic acid is high will get CTA Abd/Pelvis tomorrow.          Past Medical History:   Diagnosis Date    Abnormality of gait     Dermatophytosis  of nail     Hypothyroidism     Memory loss     Other and unspecified hyperlipidemia     Pancytopenia     Type II or unspecified type diabetes mellitus without mention of complication, uncontrolled     Vascular dementia, uncomplicated     ateriosclerotic dementia       Past Surgical History:   Procedure Laterality Date    BONE MARROW BIOPSY      2004    lens implants       dr juárez 205    SQUAMOUS CELL CARCINOMA EXCISION      left cheek   2004       Review of patient's allergies indicates:   Allergen Reactions    Glimepiride Other (See Comments)    Metformin Other (See Comments)    Zocor [simvastatin] Other (See Comments)       No current facility-administered medications on file prior to encounter.      Current Outpatient Medications on File Prior to Encounter   Medication Sig    donepezil (ARICEPT) 10 MG tablet Take 10 mg by mouth once daily.    ergocalciferol (ERGOCALCIFEROL) 50,000 unit Cap Take 50,000 Units by mouth every 7 days.    furosemide (LASIX) 20 MG tablet Take 20 mg by mouth once daily.    galantamine (RAZADYNE ER) 16 MG 24 hr capsule Take 16 mg by mouth daily with breakfast.    guaiFENesin (MUCINEX) 600 mg 12 hr tablet Take 1,200 mg by mouth Daily.    insulin NPH-insulin regular, 70/30, (NOVOLIN 70/30) 100 unit/mL (70-30) injection Inject 10 Units into the skin once daily.    insulin NPH-insulin regular, 70/30, (NOVOLIN 70/30) 100 unit/mL (70-30) injection Inject 5 Units into the skin every evening.    levothyroxine (SYNTHROID) 88 MCG tablet Take 1 tablet (88 mcg total) by mouth once daily.    memantine (NAMENDA) 5 MG Tab Take 5 mg by mouth 2 (two) times daily.    potassium chloride (MICRO-K) 10 MEQ CpSR Take 20 mEq by mouth once daily.    pravastatin (PRAVACHOL) 40 MG tablet Take 40 mg by mouth once daily.    QUEtiapine (SEROQUEL) 25 MG Tab Take 75 mg by mouth every evening.    QUEtiapine (SEROQUEL) 25 MG Tab Take 50 mg by mouth once daily.    zolpidem (AMBIEN) 5 MG Tab  Take 5 mg by mouth every evening.    [DISCONTINUED] zolpidem (AMBIEN CR) 6.25 MG CR tablet Take by mouth nightly as needed. 1/2 tab prn     TRUEPLUS LANCETS 33 gauge Misc USE TO TEST BLOOD GLUCOSE ONCE D    TRUETEST TEST STRIPS Strp USE TO TAKE BLOOD SUGAR DAILY    [DISCONTINUED] cephALEXin (KEFLEX) 500 MG capsule Take 1 capsule (500 mg total) by mouth every 8 (eight) hours.    [DISCONTINUED] diphenhydrAMINE (WAL-POLLY, DIPHENHYDRAMINE,) 50 MG capsule Take 50 mg by mouth nightly as needed for Itching.    [DISCONTINUED] lorazepam (ATIVAN) 0.5 MG tablet TAKE 1 TABLET BY MOUTH EVERY MORNING AND 1 TO 2 TABLETS BY MOUTH EVERY EVENING AS NEEDED FOR AGITATION    [DISCONTINUED] pravastatin (PRAVACHOL) 80 MG tablet Take 40 mg by mouth once daily. Pt takes 40 mg    [DISCONTINUED] quetiapine (SEROQUEL) 50 MG tablet Take 100 mg by mouth every evening.      Family History     Problem Relation (Age of Onset)    Cancer Father        Tobacco Use    Smoking status: Never Smoker    Smokeless tobacco: Never Used   Substance and Sexual Activity    Alcohol use: No    Drug use: No    Sexual activity: Yes     Partners: Female     Review of Systems   Constitutional: Negative for chills, diaphoresis, fever and unexpected weight change.   HENT: Negative for congestion, sore throat, trouble swallowing and voice change.    Eyes: Negative for photophobia and visual disturbance.   Respiratory: Negative for cough, shortness of breath and wheezing.    Cardiovascular: Negative for chest pain, palpitations and leg swelling.   Gastrointestinal: Positive for constipation, nausea and vomiting. Negative for abdominal distention, abdominal pain, blood in stool and diarrhea.   Endocrine: Negative for polydipsia, polyphagia and polyuria.   Genitourinary: Negative for decreased urine volume, difficulty urinating, hematuria and urgency.   Musculoskeletal: Negative for back pain, joint swelling, neck pain and neck stiffness.   Skin: Negative for  color change, rash and wound.   Neurological: Positive for weakness (Generalized). Negative for speech difficulty and headaches.   Psychiatric/Behavioral: Positive for confusion. Negative for agitation, decreased concentration and sleep disturbance. The patient is not nervous/anxious.      Objective:     Vital Signs (Most Recent):  Temp: 97.5 °F (36.4 °C) (08/18/19 1551)  Pulse: 62 (08/18/19 1537)  Resp: 14 (08/18/19 1537)  BP: (!) 165/74 (08/18/19 1506)  SpO2: 100 % (08/18/19 1537) Vital Signs (24h Range):  Temp:  [97.5 °F (36.4 °C)-97.6 °F (36.4 °C)] 97.5 °F (36.4 °C)  Pulse:  [52-73] 62  Resp:  [11-22] 14  SpO2:  [97 %-100 %] 100 %  BP: (109-165)/(56-74) 165/74     Weight: 77.6 kg (171 lb)  Body mass index is 26 kg/m².    Physical Exam   Constitutional: He appears well-developed and well-nourished. No distress.   HENT:   Head: Normocephalic and atraumatic.   Mouth/Throat: No oropharyngeal exudate.   Eyes: Pupils are equal, round, and reactive to light. Conjunctivae are normal. No scleral icterus.   Neck: Neck supple. No tracheal deviation present.   Cardiovascular: Normal rate, regular rhythm, normal heart sounds and intact distal pulses.   No murmur heard.  Pulmonary/Chest: Effort normal and breath sounds normal. No respiratory distress. He has no wheezes. He has no rales. He exhibits no tenderness.   Abdominal: Soft. Bowel sounds are normal. He exhibits no distension and no mass. There is no tenderness.   Musculoskeletal: He exhibits no edema or deformity.   Neurological: He is alert.   Oriented to person and place. Not oriented to time or situation   Skin: Skin is warm and dry. Capillary refill takes less than 2 seconds. No rash noted. He is not diaphoretic. No erythema. No pallor.   Psychiatric: He has a normal mood and affect. His behavior is normal.   Nursing note and vitals reviewed.        CRANIAL NERVES     CN III, IV, VI   Pupils are equal, round, and reactive to light.       Significant Labs:    Bilirubin:   Recent Labs   Lab 08/18/19  1036   BILITOT 0.7     CBC:   Recent Labs   Lab 08/18/19  1036   WBC 8.51   HGB 14.6   HCT 43.0   *     CMP:   Recent Labs   Lab 08/18/19  1036   *   K 4.2   CL 98   CO2 27   *   BUN 23   CREATININE 1.3   CALCIUM 9.4   PROT 6.9   ALBUMIN 4.0   BILITOT 0.7   ALKPHOS 108   AST 15   ALT 16   ANIONGAP 10   EGFRNONAA 50*     Cardiac Markers:   Recent Labs   Lab 08/18/19  1036   BNP 12     Coagulation:   Recent Labs   Lab 08/18/19  1036   INR 1.0   APTT 28.9     Magnesium: No results for input(s): MG in the last 48 hours.  Troponin:   Recent Labs   Lab 08/18/19  1200   TROPONINI <0.006     Urine Studies:   Recent Labs   Lab 08/18/19  1136   COLORU Yellow   APPEARANCEUA Clear   PHUR 6.0   SPECGRAV 1.025   PROTEINUA Negative   GLUCUA 1+*   KETONESU Negative   BILIRUBINUA Negative   OCCULTUA Negative   NITRITE Negative   UROBILINOGEN Negative   LEUKOCYTESUR Negative   Results for CHAZ LOCKE (MRN [id number]) as of 8/18/2019 17:34   Ref. Range 8/18/2019 16:24   Flu A & B Source Unknown Nasal swab   Influenza A, Molecular Latest Ref Range: Negative  Negative   Influenza B, Molecular Latest Ref Range: Negative  Negative       All pertinent labs within the past 24 hours have been reviewed.    Significant Imaging: I have reviewed and interpreted all pertinent imaging results/findings within the past 24 hours.   Imaging Results          US Abdomen Limited (Gallbladder) (Final result)  Result time 08/18/19 16:07:12    Final result by Jamison Gentile DO (08/18/19 16:07:12)                 Impression:      1.  No significant abnormality identified.      Electronically signed by: Jamison Gentile DO  Date:    08/18/2019  Time:    16:07             Narrative:    EXAMINATION:  US ABDOMEN LIMITED    CLINICAL HISTORY:  Acute pancreatitis without necrosis or infection, unspecified    TECHNIQUE:  Limited ultrasound of the right upper quadrant of the abdomen (including  pancreas, liver, gallbladder, common bile duct, and right kidney) was performed.    COMPARISON:  None    FINDINGS:  Liver: Normal in size measuring 12.5 cm. The liver demonstrates homogenous echotexture. no focal hepatic lesions are seen.    Biliary system: The gallbladder demonstrates no evidence of calculi. No gallbladder wall thickening.  No sonographic Blevins sign. No pericholecystic fluid. The common duct is not dilated, measuring 3.2 mm. No intrahepatic ductal dilatation.    Pancreas: The visualized portions of pancreas appear normal    Right kidney: No hydronephrosis.    The spleen is not enlarged.                                CT Abdomen Pelvis With Contrast (Final result)  Result time 08/18/19 13:52:06    Final result by Jimmie Eisenberg MD (08/18/19 13:52:06)                 Impression:      1. Above findings concerning for nonspecific stercoral proctitis without evidence of associated bowel obstruction or perforation.  Differential considerations include infectious, inflammatory or ischemic.  2. Mild colonic stool burden elsewhere which may represent constipation.  3. Atherosclerosis.  4. Few additional findings as above.  This report was flagged in Epic as abnormal.      Electronically signed by: Jimmie Eisenberg MD  Date:    08/18/2019  Time:    13:52             Narrative:    EXAMINATION:  CT ABDOMEN PELVIS WITH CONTRAST    CLINICAL HISTORY:  Abd pain, fever, abscess suspected;    TECHNIQUE:  Low dose axial images, sagittal and coronal reformations were obtained from the lung bases to the pubic symphysis following the IV administration of 75 mL of Omnipaque 350 .  Oral contrast was not given.    COMPARISON:  Abdominal series and chest radiograph earlier same day    FINDINGS:  Imaged lung bases show mild dependent atelectasis.  Base of the heart is within normal limits.    Liver, gallbladder, spleen, stomach, duodenum and bilateral adrenal glands are within normal limits.  No biliary ductal dilatation.   Pancreas is atrophic without focal process seen.    Bilateral kidneys are normal in size and location.  Bilateral mild nonspecific perinephric stranding which can be seen with advanced chronologic age.  No hydronephrosis, noting bilateral extrarenal pelvises.  Ureters are nondilated.  Urinary bladder is within normal limits.  Prostate is normal in size containing dystrophic calcifications within the central gland.    No ascites, free air or lymphadenopathy.  Mild scattered atherosclerosis of the aorta and its branch vessels without aneurysm or dissection.  No organized fluid collection to suggest drainable abscess.    Tiny fat containing umbilical hernia and small fat containing bilateral inguinal hernias.    Appendix is not identified; however, no pericecal inflammatory change.  Terminal ileum is within normal limits.  Prominent stool ball within the rectum which is mildly distended with mild perirectal fat stranding concerning for nonspecific stercoral proctitis.  Mild amount of scattered colonic fecal material elsewhere.  Several diverticula of the descending and sigmoid colon without focal diverticulitis definitively seen.  No evidence of bowel obstruction.  No pneumatosis or portal venous gas.    Osseous structures show generalized osteopenia, remote postsurgical changes of the spinous processes of L5 through S2 and age-appropriate mild degenerative change without acute or destructive process seen.                               X-Ray Chest AP Portable (Final result)  Result time 08/18/19 12:13:40    Final result by Jamison Gentile DO (08/18/19 12:13:40)                 Impression:      No acute cardiopulmonary process.      Electronically signed by: Jamison Gentile DO  Date:    08/18/2019  Time:    12:13             Narrative:    EXAMINATION:  XR CHEST AP PORTABLE    CLINICAL HISTORY:  vomiting;    TECHNIQUE:  Single frontal view of the chest was performed.    COMPARISON:  02/27/2019    FINDINGS:  No  infiltrates or pleural effusions are identified.  The heart does not appear to be enlarged for a portable exam.  There is tortuosity of the descending thoracic aorta.                               X-Ray Abdomen Flat And Erect (Final result)  Result time 08/18/19 12:14:36    Final result by Jamison Gentile DO (08/18/19 12:14:36)                 Impression:      1.  As above      Electronically signed by: Jamison Gentile DO  Date:    08/18/2019  Time:    12:14             Narrative:    EXAMINATION:  XR ABDOMEN FLAT AND ERECT    CLINICAL HISTORY:  Vomiting, unspecified    TECHNIQUE:  Flat and erect AP views of the abdomen were preformed.    COMPARISON:  None    FINDINGS:  The bowel gas pattern is nonspecific and nonobstructive.  No pathologic calcifications are identified.  No evidence for free air. Metallic density seen overlying the midline at the level of the L5-S1 level which may be a postsurgical device or something extrinsic to the patient.                                  Assessment/Plan:     * Proctitis  Fecal impaction in rectum  And CT with stercoral proctitis and Mild colonic stool burden elsewhere without evidence of associated bowel obstruction or perforation.  Not on bowel regimen at nursing home.   --Check Lactic Acid to rule out ischemia  --Soap Suds Enema followed by po lactulose  --Add Miralax daily    Elevated lipase  Nausea & vomiting  Lipase 255. No signs of pancreatitis or biliary/aura problems on CT/US  Abdominal exam benign and nausea resolved and patient is hungry.   PO challenge with clear liquids then progress diet carefully   Gentle IV Fluids  Check labs in morning        DNR (do not resuscitate)  Presents with DNR paperwork for [facility] Signed on 3/16/19. Confirmed with Patient's daughter that they would like to continue DNR status.       Hypothyroidism  Continue home synthroid      Vascular dementia, uncomplicated  Patient recently discharged from behavorial health facility for  medication adjustment.  Mood stable and pleasant  Continue home medications      Controlled type 2 diabetes mellitus with stage 3 chronic kidney disease, with long-term current use of insulin  Glucose on admit 212, even though not eating.  Takes 70/30 insulin 10 units qam, 5 units qpm  Giving about half dose Detemir 7 units qhs  Check blood glucose AC and HS and use SSI. Diabetic diet, when cleared for Diet. Check A1c.            VTE Risk Mitigation (From admission, onward)    None             Juliane Piedra NP  Department of Hospital Medicine   [facility]

## 2019-08-18 NOTE — ED NOTES
Pt and family aware of plan of care to await results from ultrasound.  Pt lying in bed resting comfortably with daughter at the bedside.  Repositioned with pillows for comfort.  VSS.  Will continue to monitor.

## 2019-08-18 NOTE — ED NOTES
Pt and family aware of plan of care to await results of ultrasound   Pt incontinent of urine, pt cleaned and diaper changed

## 2019-08-18 NOTE — ED TRIAGE NOTES
"Pt arrived to the ED via EMS from [facility], where his caretakers reported he was significantly weak.  Reported that the pt usually walks around and can complete ADL's with minimal assistance, but that the pt was unable to stand and did not want to eat breakfast.  They stated that he never passes up a meal.  His caretakers also reported that he vomited 3 times this morning.  Pt states that he feels "okay".  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    "

## 2019-08-18 NOTE — ASSESSMENT & PLAN NOTE
Presents with DNR paperwork for [facility] Signed on 3/16/19. Confirmed with Patient's daughter that they would like to continue DNR status.

## 2019-08-18 NOTE — ASSESSMENT & PLAN NOTE
Patient recently discharged from behavorial health facility for medication adjustment.  Mood stable and pleasant  Continue home medications

## 2019-08-18 NOTE — SUBJECTIVE & OBJECTIVE
Past Medical History:   Diagnosis Date    Abnormality of gait     Dermatophytosis of nail     Hypothyroidism     Memory loss     Other and unspecified hyperlipidemia     Pancytopenia     Type II or unspecified type diabetes mellitus without mention of complication, uncontrolled     Vascular dementia, uncomplicated     ateriosclerotic dementia       Past Surgical History:   Procedure Laterality Date    BONE MARROW BIOPSY      2004    lens implants       dr juárez 205    SQUAMOUS CELL CARCINOMA EXCISION      left cheek   2004       Review of patient's allergies indicates:   Allergen Reactions    Glimepiride Other (See Comments)    Metformin Other (See Comments)    Zocor [simvastatin] Other (See Comments)       No current facility-administered medications on file prior to encounter.      Current Outpatient Medications on File Prior to Encounter   Medication Sig    donepezil (ARICEPT) 10 MG tablet Take 10 mg by mouth once daily.    ergocalciferol (ERGOCALCIFEROL) 50,000 unit Cap Take 50,000 Units by mouth every 7 days.    furosemide (LASIX) 20 MG tablet Take 20 mg by mouth once daily.    galantamine (RAZADYNE ER) 16 MG 24 hr capsule Take 16 mg by mouth daily with breakfast.    guaiFENesin (MUCINEX) 600 mg 12 hr tablet Take 1,200 mg by mouth Daily.    insulin NPH-insulin regular, 70/30, (NOVOLIN 70/30) 100 unit/mL (70-30) injection Inject 10 Units into the skin once daily.    insulin NPH-insulin regular, 70/30, (NOVOLIN 70/30) 100 unit/mL (70-30) injection Inject 5 Units into the skin every evening.    levothyroxine (SYNTHROID) 88 MCG tablet Take 1 tablet (88 mcg total) by mouth once daily.    memantine (NAMENDA) 5 MG Tab Take 5 mg by mouth 2 (two) times daily.    potassium chloride (MICRO-K) 10 MEQ CpSR Take 20 mEq by mouth once daily.    pravastatin (PRAVACHOL) 40 MG tablet Take 40 mg by mouth once daily.    QUEtiapine (SEROQUEL) 25 MG Tab Take 75 mg by mouth every evening.    QUEtiapine  (SEROQUEL) 25 MG Tab Take 50 mg by mouth once daily.    zolpidem (AMBIEN) 5 MG Tab Take 5 mg by mouth every evening.    [DISCONTINUED] zolpidem (AMBIEN CR) 6.25 MG CR tablet Take by mouth nightly as needed. 1/2 tab prn     TRUEPLUS LANCETS 33 gauge Misc USE TO TEST BLOOD GLUCOSE ONCE D    TRUETEST TEST STRIPS Strp USE TO TAKE BLOOD SUGAR DAILY    [DISCONTINUED] cephALEXin (KEFLEX) 500 MG capsule Take 1 capsule (500 mg total) by mouth every 8 (eight) hours.    [DISCONTINUED] diphenhydrAMINE (WAL-POLLY, DIPHENHYDRAMINE,) 50 MG capsule Take 50 mg by mouth nightly as needed for Itching.    [DISCONTINUED] lorazepam (ATIVAN) 0.5 MG tablet TAKE 1 TABLET BY MOUTH EVERY MORNING AND 1 TO 2 TABLETS BY MOUTH EVERY EVENING AS NEEDED FOR AGITATION    [DISCONTINUED] pravastatin (PRAVACHOL) 80 MG tablet Take 40 mg by mouth once daily. Pt takes 40 mg    [DISCONTINUED] quetiapine (SEROQUEL) 50 MG tablet Take 100 mg by mouth every evening.      Family History     Problem Relation (Age of Onset)    Cancer Father        Tobacco Use    Smoking status: Never Smoker    Smokeless tobacco: Never Used   Substance and Sexual Activity    Alcohol use: No    Drug use: No    Sexual activity: Yes     Partners: Female     Review of Systems   Constitutional: Negative for chills, diaphoresis, fever and unexpected weight change.   HENT: Negative for congestion, sore throat, trouble swallowing and voice change.    Eyes: Negative for photophobia and visual disturbance.   Respiratory: Negative for cough, shortness of breath and wheezing.    Cardiovascular: Negative for chest pain, palpitations and leg swelling.   Gastrointestinal: Positive for constipation, nausea and vomiting. Negative for abdominal distention, abdominal pain, blood in stool and diarrhea.   Endocrine: Negative for polydipsia, polyphagia and polyuria.   Genitourinary: Negative for decreased urine volume, difficulty urinating, hematuria and urgency.   Musculoskeletal: Negative  for back pain, joint swelling, neck pain and neck stiffness.   Skin: Negative for color change, rash and wound.   Neurological: Positive for weakness (Generalized). Negative for speech difficulty and headaches.   Psychiatric/Behavioral: Positive for confusion. Negative for agitation, decreased concentration and sleep disturbance. The patient is not nervous/anxious.      Objective:     Vital Signs (Most Recent):  Temp: 97.5 °F (36.4 °C) (08/18/19 1551)  Pulse: 62 (08/18/19 1537)  Resp: 14 (08/18/19 1537)  BP: (!) 165/74 (08/18/19 1506)  SpO2: 100 % (08/18/19 1537) Vital Signs (24h Range):  Temp:  [97.5 °F (36.4 °C)-97.6 °F (36.4 °C)] 97.5 °F (36.4 °C)  Pulse:  [52-73] 62  Resp:  [11-22] 14  SpO2:  [97 %-100 %] 100 %  BP: (109-165)/(56-74) 165/74     Weight: 77.6 kg (171 lb)  Body mass index is 26 kg/m².    Physical Exam   Constitutional: He appears well-developed and well-nourished. No distress.   HENT:   Head: Normocephalic and atraumatic.   Mouth/Throat: No oropharyngeal exudate.   Eyes: Pupils are equal, round, and reactive to light. Conjunctivae are normal. No scleral icterus.   Neck: Neck supple. No tracheal deviation present.   Cardiovascular: Normal rate, regular rhythm, normal heart sounds and intact distal pulses.   No murmur heard.  Pulmonary/Chest: Effort normal and breath sounds normal. No respiratory distress. He has no wheezes. He has no rales. He exhibits no tenderness.   Abdominal: Soft. Bowel sounds are normal. He exhibits no distension and no mass. There is no tenderness.   Musculoskeletal: He exhibits no edema or deformity.   Neurological: He is alert.   Oriented to person and place. Not oriented to time or situation   Skin: Skin is warm and dry. Capillary refill takes less than 2 seconds. No rash noted. He is not diaphoretic. No erythema. No pallor.   Psychiatric: He has a normal mood and affect. His behavior is normal.   Nursing note and vitals reviewed.        CRANIAL NERVES     CN III, IV, VI    Pupils are equal, round, and reactive to light.       Significant Labs:   Bilirubin:   Recent Labs   Lab 08/18/19  1036   BILITOT 0.7     CBC:   Recent Labs   Lab 08/18/19  1036   WBC 8.51   HGB 14.6   HCT 43.0   *     CMP:   Recent Labs   Lab 08/18/19  1036   *   K 4.2   CL 98   CO2 27   *   BUN 23   CREATININE 1.3   CALCIUM 9.4   PROT 6.9   ALBUMIN 4.0   BILITOT 0.7   ALKPHOS 108   AST 15   ALT 16   ANIONGAP 10   EGFRNONAA 50*     Cardiac Markers:   Recent Labs   Lab 08/18/19  1036   BNP 12     Coagulation:   Recent Labs   Lab 08/18/19  1036   INR 1.0   APTT 28.9     Magnesium: No results for input(s): MG in the last 48 hours.  Troponin:   Recent Labs   Lab 08/18/19  1200   TROPONINI <0.006     Urine Studies:   Recent Labs   Lab 08/18/19  1136   COLORU Yellow   APPEARANCEUA Clear   PHUR 6.0   SPECGRAV 1.025   PROTEINUA Negative   GLUCUA 1+*   KETONESU Negative   BILIRUBINUA Negative   OCCULTUA Negative   NITRITE Negative   UROBILINOGEN Negative   LEUKOCYTESUR Negative   Results for CHAZ LOCKE (MRN [id number]) as of 8/18/2019 17:34   Ref. Range 8/18/2019 16:24   Flu A & B Source Unknown Nasal swab   Influenza A, Molecular Latest Ref Range: Negative  Negative   Influenza B, Molecular Latest Ref Range: Negative  Negative       All pertinent labs within the past 24 hours have been reviewed.    Significant Imaging: I have reviewed and interpreted all pertinent imaging results/findings within the past 24 hours.   Imaging Results          US Abdomen Limited (Gallbladder) (Final result)  Result time 08/18/19 16:07:12    Final result by Jamison Gentile DO (08/18/19 16:07:12)                 Impression:      1.  No significant abnormality identified.      Electronically signed by: Jamison Gentile DO  Date:    08/18/2019  Time:    16:07             Narrative:    EXAMINATION:  US ABDOMEN LIMITED    CLINICAL HISTORY:  Acute pancreatitis without necrosis or infection,  unspecified    TECHNIQUE:  Limited ultrasound of the right upper quadrant of the abdomen (including pancreas, liver, gallbladder, common bile duct, and right kidney) was performed.    COMPARISON:  None    FINDINGS:  Liver: Normal in size measuring 12.5 cm. The liver demonstrates homogenous echotexture. no focal hepatic lesions are seen.    Biliary system: The gallbladder demonstrates no evidence of calculi. No gallbladder wall thickening.  No sonographic Blevins sign. No pericholecystic fluid. The common duct is not dilated, measuring 3.2 mm. No intrahepatic ductal dilatation.    Pancreas: The visualized portions of pancreas appear normal    Right kidney: No hydronephrosis.    The spleen is not enlarged.                                CT Abdomen Pelvis With Contrast (Final result)  Result time 08/18/19 13:52:06    Final result by Jimmie Eisenberg MD (08/18/19 13:52:06)                 Impression:      1. Above findings concerning for nonspecific stercoral proctitis without evidence of associated bowel obstruction or perforation.  Differential considerations include infectious, inflammatory or ischemic.  2. Mild colonic stool burden elsewhere which may represent constipation.  3. Atherosclerosis.  4. Few additional findings as above.  This report was flagged in Epic as abnormal.      Electronically signed by: Jimmie Eisenberg MD  Date:    08/18/2019  Time:    13:52             Narrative:    EXAMINATION:  CT ABDOMEN PELVIS WITH CONTRAST    CLINICAL HISTORY:  Abd pain, fever, abscess suspected;    TECHNIQUE:  Low dose axial images, sagittal and coronal reformations were obtained from the lung bases to the pubic symphysis following the IV administration of 75 mL of Omnipaque 350 .  Oral contrast was not given.    COMPARISON:  Abdominal series and chest radiograph earlier same day    FINDINGS:  Imaged lung bases show mild dependent atelectasis.  Base of the heart is within normal limits.    Liver, gallbladder, spleen, stomach,  duodenum and bilateral adrenal glands are within normal limits.  No biliary ductal dilatation.  Pancreas is atrophic without focal process seen.    Bilateral kidneys are normal in size and location.  Bilateral mild nonspecific perinephric stranding which can be seen with advanced chronologic age.  No hydronephrosis, noting bilateral extrarenal pelvises.  Ureters are nondilated.  Urinary bladder is within normal limits.  Prostate is normal in size containing dystrophic calcifications within the central gland.    No ascites, free air or lymphadenopathy.  Mild scattered atherosclerosis of the aorta and its branch vessels without aneurysm or dissection.  No organized fluid collection to suggest drainable abscess.    Tiny fat containing umbilical hernia and small fat containing bilateral inguinal hernias.    Appendix is not identified; however, no pericecal inflammatory change.  Terminal ileum is within normal limits.  Prominent stool ball within the rectum which is mildly distended with mild perirectal fat stranding concerning for nonspecific stercoral proctitis.  Mild amount of scattered colonic fecal material elsewhere.  Several diverticula of the descending and sigmoid colon without focal diverticulitis definitively seen.  No evidence of bowel obstruction.  No pneumatosis or portal venous gas.    Osseous structures show generalized osteopenia, remote postsurgical changes of the spinous processes of L5 through S2 and age-appropriate mild degenerative change without acute or destructive process seen.                               X-Ray Chest AP Portable (Final result)  Result time 08/18/19 12:13:40    Final result by Jamison Gentile DO (08/18/19 12:13:40)                 Impression:      No acute cardiopulmonary process.      Electronically signed by: Jamison Gentlie DO  Date:    08/18/2019  Time:    12:13             Narrative:    EXAMINATION:  XR CHEST AP PORTABLE    CLINICAL  HISTORY:  vomiting;    TECHNIQUE:  Single frontal view of the chest was performed.    COMPARISON:  02/27/2019    FINDINGS:  No infiltrates or pleural effusions are identified.  The heart does not appear to be enlarged for a portable exam.  There is tortuosity of the descending thoracic aorta.                               X-Ray Abdomen Flat And Erect (Final result)  Result time 08/18/19 12:14:36    Final result by Jamison Gentile DO (08/18/19 12:14:36)                 Impression:      1.  As above      Electronically signed by: Jamison Gentile DO  Date:    08/18/2019  Time:    12:14             Narrative:    EXAMINATION:  XR ABDOMEN FLAT AND ERECT    CLINICAL HISTORY:  Vomiting, unspecified    TECHNIQUE:  Flat and erect AP views of the abdomen were preformed.    COMPARISON:  None    FINDINGS:  The bowel gas pattern is nonspecific and nonobstructive.  No pathologic calcifications are identified.  No evidence for free air. Metallic density seen overlying the midline at the level of the L5-S1 level which may be a postsurgical device or something extrinsic to the patient.

## 2019-08-18 NOTE — HPI
Yayo Daniels is a 84 y.o. male who  has a PMhx Diabetes Type 2, CKD 3, Vascular Dementia with gait instability, Squamous cell CA, Hypothyroidism, HLD,  And  Pancytopenia.  He is a resident of [facility], but was recently discharged from a behavior health facility for dementia.      He presented to the ED per EMS due to emesis and weakness. Patient's relative reports that [facility] reported 3 episodes of emesis, but patient reports no vomiting today, only gagging. Relative states that his appetite is poor and is more lethargic then normal.  The patient denies any abdominal pain or nausea, but is a poor historian due to his dementia. ED workup revealed essentially normal CBC and CMP.  UA negative. His Lipase was 255.  His abdominal exam was benign with no TTP or current nausea and was stating that he was hungry and wants eat.  He had Abd CT with contrast and Abdominal US.  Both negative for pancreatitis.  No concerning findings on US. CT concerning Stercoral proctitis due to Prominent stool ball within the rectum.  Will check a Lactic Acid to r/o ischemia.  He was given a liter IVF in the ED. He will be admitted for observation for bowel care.  If he lactic acid is high will get CTA Abd/Pelvis tomorrow.

## 2019-08-18 NOTE — ED NOTES
Wife at the bedside.  Daughters that were at the bedside are leaving for about an hour.  Please call if anything changes with the patient.  Silvia [phone] or Katerina [phone].  Daughters state that the wife (their mother) can get a little confused sometimes but that she should be okay for while they are gone.  Pt lying in bed resting. Family aware of plan of care to be admitted.  VSS.  Will continue to monitor

## 2019-08-18 NOTE — ASSESSMENT & PLAN NOTE
Fecal impaction in rectum  And CT with stercoral proctitis and Mild colonic stool burden elsewhere without evidence of associated bowel obstruction or perforation.  Not on bowel regimen at nursing home.   --Check Lactic Acid to rule out ischemia  --Soap Suds Enema followed by po lactulose  --Add Miralax daily

## 2019-08-18 NOTE — ED NOTES
Admitting NP at bedside speaking to pt and family,  Pt and family updated on flu neg status, and that pt is unable to get CTA pelvis until tomorrow due to renal function and already receiving contrast in earlier study, pt incontinent   of urine, pt cleaned and linins changed, pt repositioned for comfort

## 2019-08-18 NOTE — ED NOTES
Pt's daughter gave okay after speaking with pt's wife (her mother) regarding proceeding with CT scan and possible admit.  MD notified

## 2019-08-18 NOTE — ASSESSMENT & PLAN NOTE
Nausea & vomiting  Lipase 255. No signs of pancreatitis or biliary/aura problems on CT/US  Abdominal exam benign and nausea resolved and patient is hungry.   PO challenge with clear liquids then progress diet carefully   Gentle IV Fluids  Check labs in morning

## 2019-08-18 NOTE — ED PROVIDER NOTES
Encounter Date: 8/18/2019    SCRIBE #1 NOTE: I, Lazara Johnson, am scribing for, and in the presence of,  Dr. Alejandro. I have scribed the entire note.       History     Chief Complaint   Patient presents with    Emesis     Pt presents to ED today via EMS from [facility] c/o several episodes of vomiting since released from behavior health facility for dementia      Yayo Daniels is a 84 y.o. male who  has a past medical history of Abnormality of gait, Dermatophytosis of nail, Hypothyroidism, Memory loss, Other and unspecified hyperlipidemia, Pancytopenia, Type II or unspecified type diabetes mellitus without mention of complication, uncontrolled, and Vascular dementia, uncomplicated.    The patient presents to the ED per EMS from [facility] due to emesis and weakness. Patient's relative reports that the [facility] home reported 3 episodes of emesis, but patient reports no vomiting today, only gagging. Relative states he missed breakfast, which he usually doesn't. She also states he hasn't been up and moving like he normally is. He denies any abdominal pain or nausea. Patient has history of Alzheimer's disease.     The history is provided by the patient and a relative. The history is limited by the condition of the patient.     Review of patient's allergies indicates:   Allergen Reactions    Aricept [donepezil] Other (See Comments)    Glimepiride Other (See Comments)    Metformin Other (See Comments)    Namenda [memantine]     Pravachol [pravastatin] Other (See Comments)    Zocor [simvastatin] Other (See Comments)     Past Medical History:   Diagnosis Date    Abnormality of gait     Dermatophytosis of nail     Hypothyroidism     Memory loss     Other and unspecified hyperlipidemia     Pancytopenia     Type II or unspecified type diabetes mellitus without mention of complication, uncontrolled     Vascular dementia, uncomplicated     ateriosclerotic dementia     Past Surgical History:   Procedure  Laterality Date    BONE MARROW BIOPSY      2004    lens implants       dr juárez 205    SQUAMOUS CELL CARCINOMA EXCISION      left cheek   2004     Family History   Problem Relation Age of Onset    Cancer Father      Social History     Tobacco Use    Smoking status: Never Smoker    Smokeless tobacco: Never Used   Substance Use Topics    Alcohol use: No    Drug use: No     Review of Systems   Constitutional: Negative for chills and fever.   HENT: Negative for rhinorrhea, sore throat and trouble swallowing.    Eyes: Negative for visual disturbance.   Respiratory: Negative for cough and shortness of breath.    Cardiovascular: Negative for chest pain.   Gastrointestinal: Positive for vomiting. Negative for abdominal pain, diarrhea and nausea.   Genitourinary: Negative for dysuria and hematuria.   Musculoskeletal: Negative for back pain.   Skin: Negative for rash.   Neurological: Positive for weakness. Negative for numbness and headaches.   Hematological: Negative for adenopathy.       Physical Exam     Initial Vitals [08/18/19 1001]   BP Pulse Resp Temp SpO2   (!) 109/56 (!) 52 16 97.6 °F (36.4 °C) 99 %      MAP       --         Physical Exam    Nursing note and vitals reviewed.  Constitutional: He appears well-developed and well-nourished. He is not diaphoretic. No distress.   HENT:   Head: Normocephalic and atraumatic.   Mouth/Throat: Oropharynx is clear and moist.   Eyes: Conjunctivae and EOM are normal.   Neck: Normal range of motion. Neck supple.   Cardiovascular: Normal rate, regular rhythm and normal heart sounds. Exam reveals no gallop and no friction rub.    No murmur heard.  Pulmonary/Chest: He has no wheezes. He has no rhonchi. He has rales.   Bibasilar rales.    Abdominal: Soft. There is no tenderness. There is no rebound and no guarding.   Musculoskeletal: Normal range of motion. He exhibits edema. He exhibits no tenderness.   Less than 1+ pitting edema to bilateral feet.    Lymphadenopathy:     He  has no cervical adenopathy.   Neurological: He is alert and oriented to person, place, and time. He has normal strength.   Skin: Skin is warm and dry. No rash noted.         ED Course   Procedures  Labs Reviewed   CBC W/ AUTO DIFFERENTIAL - Abnormal; Notable for the following components:       Result Value    Mean Corpuscular Hemoglobin 31.6 (*)     Platelets 128 (*)     Lymph # 0.6 (*)     Gran% 84.9 (*)     Lymph% 6.5 (*)     All other components within normal limits   COMPREHENSIVE METABOLIC PANEL - Abnormal; Notable for the following components:    Sodium 135 (*)     Glucose 212 (*)     eGFR if  58 (*)     eGFR if non  50 (*)     All other components within normal limits   URINALYSIS, REFLEX TO URINE CULTURE - Abnormal; Notable for the following components:    Glucose, UA 1+ (*)     All other components within normal limits    Narrative:     Preferred Collection Type->Urine, Clean Catch   LIPASE - Abnormal; Notable for the following components:    Lipase 255 (*)     All other components within normal limits   INFLUENZA A & B BY MOLECULAR   APTT   B-TYPE NATRIURETIC PEPTIDE   LIPASE   PROTIME-INR   TROPONIN I   TROPONIN I   PROTIME-INR   B-TYPE NATRIURETIC PEPTIDE   LACTIC ACID, PLASMA     EKG Readings: (Independently Interpreted)   11:41 AM. Normal sinus rhythm with rate of 63 bpm. No ST or T wave changes.        Imaging Results          US Abdomen Limited (Gallbladder) (Final result)  Result time 08/18/19 16:07:12    Final result by Jamison Gentile DO (08/18/19 16:07:12)                 Impression:      1.  No significant abnormality identified.      Electronically signed by: Jamison Gentile DO  Date:    08/18/2019  Time:    16:07             Narrative:    EXAMINATION:  US ABDOMEN LIMITED    CLINICAL HISTORY:  Acute pancreatitis without necrosis or infection, unspecified    TECHNIQUE:  Limited ultrasound of the right upper quadrant of the abdomen (including pancreas, liver,  gallbladder, common bile duct, and right kidney) was performed.    COMPARISON:  None    FINDINGS:  Liver: Normal in size measuring 12.5 cm. The liver demonstrates homogenous echotexture. no focal hepatic lesions are seen.    Biliary system: The gallbladder demonstrates no evidence of calculi. No gallbladder wall thickening.  No sonographic Blevins sign. No pericholecystic fluid. The common duct is not dilated, measuring 3.2 mm. No intrahepatic ductal dilatation.    Pancreas: The visualized portions of pancreas appear normal    Right kidney: No hydronephrosis.    The spleen is not enlarged.                                CT Abdomen Pelvis With Contrast (Final result)  Result time 08/18/19 13:52:06    Final result by Jimmie Eisenberg MD (08/18/19 13:52:06)                 Impression:      1. Above findings concerning for nonspecific stercoral proctitis without evidence of associated bowel obstruction or perforation.  Differential considerations include infectious, inflammatory or ischemic.  2. Mild colonic stool burden elsewhere which may represent constipation.  3. Atherosclerosis.  4. Few additional findings as above.  This report was flagged in Epic as abnormal.      Electronically signed by: Jimmie Eisenberg MD  Date:    08/18/2019  Time:    13:52             Narrative:    EXAMINATION:  CT ABDOMEN PELVIS WITH CONTRAST    CLINICAL HISTORY:  Abd pain, fever, abscess suspected;    TECHNIQUE:  Low dose axial images, sagittal and coronal reformations were obtained from the lung bases to the pubic symphysis following the IV administration of 75 mL of Omnipaque 350 .  Oral contrast was not given.    COMPARISON:  Abdominal series and chest radiograph earlier same day    FINDINGS:  Imaged lung bases show mild dependent atelectasis.  Base of the heart is within normal limits.    Liver, gallbladder, spleen, stomach, duodenum and bilateral adrenal glands are within normal limits.  No biliary ductal dilatation.  Pancreas is atrophic  without focal process seen.    Bilateral kidneys are normal in size and location.  Bilateral mild nonspecific perinephric stranding which can be seen with advanced chronologic age.  No hydronephrosis, noting bilateral extrarenal pelvises.  Ureters are nondilated.  Urinary bladder is within normal limits.  Prostate is normal in size containing dystrophic calcifications within the central gland.    No ascites, free air or lymphadenopathy.  Mild scattered atherosclerosis of the aorta and its branch vessels without aneurysm or dissection.  No organized fluid collection to suggest drainable abscess.    Tiny fat containing umbilical hernia and small fat containing bilateral inguinal hernias.    Appendix is not identified; however, no pericecal inflammatory change.  Terminal ileum is within normal limits.  Prominent stool ball within the rectum which is mildly distended with mild perirectal fat stranding concerning for nonspecific stercoral proctitis.  Mild amount of scattered colonic fecal material elsewhere.  Several diverticula of the descending and sigmoid colon without focal diverticulitis definitively seen.  No evidence of bowel obstruction.  No pneumatosis or portal venous gas.    Osseous structures show generalized osteopenia, remote postsurgical changes of the spinous processes of L5 through S2 and age-appropriate mild degenerative change without acute or destructive process seen.                               X-Ray Chest AP Portable (Final result)  Result time 08/18/19 12:13:40    Final result by Jamison Gentile DO (08/18/19 12:13:40)                 Impression:      No acute cardiopulmonary process.      Electronically signed by: Jamison Gentile DO  Date:    08/18/2019  Time:    12:13             Narrative:    EXAMINATION:  XR CHEST AP PORTABLE    CLINICAL HISTORY:  vomiting;    TECHNIQUE:  Single frontal view of the chest was performed.    COMPARISON:  02/27/2019    FINDINGS:  No infiltrates or pleural  effusions are identified.  The heart does not appear to be enlarged for a portable exam.  There is tortuosity of the descending thoracic aorta.                               X-Ray Abdomen Flat And Erect (Final result)  Result time 08/18/19 12:14:36    Final result by Jamison Gentile DO (08/18/19 12:14:36)                 Impression:      1.  As above      Electronically signed by: Jamisno Gentile DO  Date:    08/18/2019  Time:    12:14             Narrative:    EXAMINATION:  XR ABDOMEN FLAT AND ERECT    CLINICAL HISTORY:  Vomiting, unspecified    TECHNIQUE:  Flat and erect AP views of the abdomen were preformed.    COMPARISON:  None    FINDINGS:  The bowel gas pattern is nonspecific and nonobstructive.  No pathologic calcifications are identified.  No evidence for free air. Metallic density seen overlying the midline at the level of the L5-S1 level which may be a postsurgical device or something extrinsic to the patient.                                 Medical Decision Making:   Independently Interpreted Test(s):   I have ordered and independently interpreted EKG Reading(s) - see prior notes  Clinical Tests:   Lab Tests: Ordered and Reviewed  Radiological Study: Ordered and Reviewed  Medical Tests: Ordered and Reviewed                   ED Course as of Aug 18 1705   Sun Aug 18, 2019   1607 Patient signed out to Dr. Cui at 1600 to check US and CTA.    [ST]   1704 I spoke with Primitivo with CT - unable to get CTA for 24 hours due to decreased renal function and pt already had IV contrast today.  I relayed this information to the admitting team.      [TH]      ED Course User Index  [ST] Michelle Alejandro MD  [TH] Janice Cui MD     Clinical Impression:       ICD-10-CM ICD-9-CM   1. Vomiting R11.10 787.03   2. Vomiting R11.10 787.03   3. Pancreatitis, acute K85.90 577.0            I, Dr. Janice Cui, personally performed the services described in this documentation.   All medical record entries made by the scribe were at my  direction and in my presence.   I have reviewed the chart and agree that the record is accurate and complete.   Janice Cui MD.  5:04 PM 08/18/2019          Janice Cui MD  08/18/19 3720

## 2019-08-18 NOTE — ASSESSMENT & PLAN NOTE
Glucose on admit 212, even though not eating.  Takes 70/30 insulin 10 units qam, 5 units qpm  Giving about half dose Detemir 7 units qhs  Check blood glucose AC and HS and use SSI. Diabetic diet, when cleared for Diet. Check A1c.

## 2019-08-19 PROBLEM — N18.30 CKD STAGE 3 SECONDARY TO DIABETES: Chronic | Status: ACTIVE | Noted: 2017-03-13

## 2019-08-19 PROBLEM — E11.22 CKD STAGE 3 SECONDARY TO DIABETES: Chronic | Status: ACTIVE | Noted: 2017-03-13

## 2019-08-19 PROBLEM — L53.9: Status: ACTIVE | Noted: 2019-01-01

## 2019-08-19 NOTE — PT/OT/SLP PROGRESS
Occupational Therapy Evaluation Attempt and Discharge      Patient Name:  Yayo Daniels   MRN:  [id number]    11:39 AM OT attempted initial evaluation; Radiology in room performing procedure. OT spoke with spouse in [place] who states that the family does not wish for the pt to be seen by OT or PT this hospital stay. OT will d/c orders at this time. Please reconsult if necessary.    Lilly Valverde OT  8/19/2019

## 2019-08-19 NOTE — ED NOTES
Pt sleeping. rr even and unlabored on ra. Nadn. Cardiac monitoring continued. Pt clean and dry at this time. Pt wife at bedside. Updated pt on plan of care, wife verbalized understanding.

## 2019-08-19 NOTE — ED NOTES
Pt with small bm noted. Pt cleaned and changed. rr even and unlabored on ra. Nadn. Cardiac monitoring continued. Call light within reach. Wife at bedside.

## 2019-08-19 NOTE — ED NOTES
Pt urinated 200cc urine into urinal. Pt spilled some onto sheet. Pt cleaned, turned, and repositioned in bed. Pt wife and daughter at bedside. Call light within reach. Will continue to monitor.

## 2019-08-19 NOTE — PLAN OF CARE
VN cued into pt's room for introduction with pt's wife permission.  Pt has history of Alzheimers and [facility] [place]. VN role explained and informed pt/wife and daughter that VN would be working with bedside nurse and rest of care team.  Fall risk and bed alarm protocol education provided.  Instructed pt to call for assistance.  Pt's wife and daughter  aware and agreeable.  Allowed time for questions.  No acute distress noted.  Will continue to be available as needed.

## 2019-08-19 NOTE — PT/OT/SLP EVAL
Physical Therapy Evaluation    Patient Name:  Yayo Daniels   MRN:  [id number]    Recommendations:     Discharge Recommendations:  other (see comments)(return to [facility])   Discharge Equipment Recommendations: none   Barriers to discharge: None    Assessment:     Yayo Daniels is a 84 y.o. male admitted with a medical diagnosis of Proctitis.  He presents with the following impairments/functional limitations:  weakness, gait instability, impaired functional mobilty, impaired self care skills, impaired cognition, impaired balance, decreased safety awareness, impaired endurance . Patient confused oriented only to self. Able to come from supine <> sit with supervision. Sit <> stand with CG assist. Able to take steps to chair at bedside with HHA/min assist ~ 5 steps. Patient a little unsteady. .    Rehab Prognosis: Fair; patient would benefit from acute skilled PT services to address these deficits and reach maximum level of function.    Recent Surgery: * No surgery found *      Plan:     During this hospitalization, patient to be seen 5 x/week to address the identified rehab impairments via gait training, therapeutic activities and progress toward the following goals:    · Plan of Care Expires:  09/19/19    Subjective     Chief Complaint: none voiced  Patient/Family Comments/goals: none voiced  Pain/Comfort:  · Pain Rating 1: 0/10  · Pain Rating Post-Intervention 1: 0/10    Patients cultural, spiritual, Spiritism conflicts given the current situation:      Living Environment:  Lives a [facility]  Prior to admission, patients level of function needs supervision and assistance at times.  Equipment used at home: none.  DME owned (not currently used): none.  Upon discharge, patient will have assistance from staff and family.    Objective:     Communicated with primary nurse prior to session.  Patient found HOB elevated with peripheral IV, bed alarm  upon PT entry to room.    General Precautions:  Standard, fall   Orthopedic Precautions:N/A   Braces: N/A     Exams:  · Cognitive Exam:  Patient is oriented to self  · RLE ROM: WFL  · RLE Strength: +3/5 to 4/5  · LLE ROM: WFL  · LLE Strength: +3/5 to 4/5    Functional Mobility:  · Bed Mobility:     · Supine to Sit: supervision and stand by assistance  · Transfers:     · Sit to Stand:  stand by assistance and contact guard assistance with hand-held assist  · Gait: Steps ~ 5 with min/HHA   · Balance: fair to poor +      Therapeutic Activities and Exercises:   na    AM-PAC 6 CLICK MOBILITY  Total Score:20     Patient left up in chair with all lines intact, call button in reach, chair alarm on and daughter present.    GOALS:   Multidisciplinary Problems     Physical Therapy Goals        Problem: Physical Therapy Goal    Goal Priority Disciplines Outcome Goal Variances Interventions   Physical Therapy Goal     PT, PT/OT Ongoing (interventions implemented as appropriate)     Description:  Goals to be met by: 2019     Patient will increase functional independence with mobility by performin. Supine to sit with Modified [place]  2. Sit to stand transfer with Modified [place]  3. Gait  x 150 feet with Supervision using no AD.                       History:     Past Medical History:   Diagnosis Date    Abnormality of gait     Alzheimer disease     Dermatophytosis of nail     Hypothyroidism     Memory loss     Other and unspecified hyperlipidemia     Pancytopenia     Type II or unspecified type diabetes mellitus without mention of complication, uncontrolled     Vascular dementia, uncomplicated     ateriosclerotic dementia       Past Surgical History:   Procedure Laterality Date    BONE MARROW BIOPSY          lens implants       dr juárez 205    SQUAMOUS CELL CARCINOMA EXCISION      left cheek          Time Tracking:     PT Received On: 19  PT Start Time: 1420     PT Stop Time: 1445  PT Total Time (min): 25 min     Billable  Minutes: Evaluation 10 and Therapeutic Activity 15      Bryon Jackson, PT  08/19/2019

## 2019-08-19 NOTE — PLAN OF CARE
Problem: Physical Therapy Goal  Goal: Physical Therapy Goal  Goals to be met by: 2019     Patient will increase functional independence with mobility by performin. Supine to sit with Modified [place]  2. Sit to stand transfer with Modified [place]  3. Gait  x 150 feet with Supervision using no AD.     Outcome: Ongoing (interventions implemented as appropriate)  Recommend return back to [facility]  No DME needs apparent

## 2019-08-19 NOTE — ED NOTES
Pt awake and talking with family members at bedside. Pt appears less agitated at this time. rr even and unlabored on ra. Cardiac monitoring continued. Family to remain at bedside. Will continue to monitor.

## 2019-08-19 NOTE — ED NOTES
Pt resting quietly at this time with wife at bedside. Awake and alert. Resp even and unlabored. Pt picking at IV site; secured with tape for addt'l protection. Instructed pt to refrain from touching IV site. Incontinence care given and assisted pt reposition in bed. No reports of n/v at this time.

## 2019-08-19 NOTE — SUBJECTIVE & OBJECTIVE
Interval History: awake and alert, not in any distressed, reported BM yesterday, family did not appreciate the quantity still feels he is impacted. Family reported he tolerated his dinner yesterday. Spouse concern with recurrent constipation. Discussed plan for schedule bowel regimen on discharge.   family also noted groin erythema- miconazole powder.  D/c IVF      Review of Systems   Constitutional: Negative for chills, diaphoresis, fever and unexpected weight change.   Respiratory: Negative for cough.    Cardiovascular: Negative for palpitations.   Gastrointestinal: Negative for constipation and vomiting.   Musculoskeletal: Negative for back pain.   Skin: Positive for rash. Negative for color change and wound.   Neurological: Negative for weakness (Generalized).   Psychiatric/Behavioral: Negative for agitation, confusion and decreased concentration.     Objective:     Vital Signs (Most Recent):  Temp: 97.8 °F (36.6 °C) (08/19/19 0311)  Pulse: 65(Simultaneous filing. User may not have seen previous data.) (08/19/19 0617)  Resp: 14(Simultaneous filing. User may not have seen previous data.) (08/19/19 0617)  BP: (!) 144/67(Simultaneous filing. User may not have seen previous data.) (08/19/19 0617)  SpO2: 95 % (08/19/19 0617) Vital Signs (24h Range):  Temp:  [97.5 °F (36.4 °C)-98.3 °F (36.8 °C)] 97.8 °F (36.6 °C)  Pulse:  [52-91] 65  Resp:  [11-22] 14  SpO2:  [94 %-100 %] 95 %  BP: (109-179)/(56-80) 144/67     Weight: 77.6 kg (171 lb)  Body mass index is 26 kg/m².    Intake/Output Summary (Last 24 hours) at 8/19/2019 0810  Last data filed at 8/18/2019 1740  Gross per 24 hour   Intake 1000 ml   Output --   Net 1000 ml      Physical Exam   Constitutional: He appears well-developed and well-nourished. No distress.   HENT:   Head: Normocephalic and atraumatic.   Neck: Neck supple.   Cardiovascular: Normal rate, regular rhythm, normal heart sounds and intact distal pulses.   No murmur heard.  Pulmonary/Chest: Effort normal  and breath sounds normal. No respiratory distress. He exhibits no tenderness.   Abdominal: Soft. Bowel sounds are normal. He exhibits no distension and no mass. There is no tenderness.   Musculoskeletal: He exhibits no edema.   Neurological: He is alert.   Oriented to person and place. Not oriented to time or situation   Skin: Skin is warm and dry. Capillary refill takes less than 2 seconds. No rash noted. He is not diaphoretic. There is erythema. No pallor.   Mild groin erythema no rash   Psychiatric: He has a normal mood and affect. His behavior is normal.   Nursing note and vitals reviewed.      Significant Labs:   Blood Culture: No results for input(s): LABBLOO in the last 48 hours.  CBC:   Recent Labs   Lab 08/18/19  1036   WBC 8.51   HGB 14.6   HCT 43.0   *     CMP:   Recent Labs   Lab 08/18/19  1036 08/19/19  0610   * 138   K 4.2 3.8   CL 98 104   CO2 27 25   * 181*   BUN 23 17   CREATININE 1.3 1.1   CALCIUM 9.4 8.6*   PROT 6.9  --    ALBUMIN 4.0  --    BILITOT 0.7  --    ALKPHOS 108  --    AST 15  --    ALT 16  --    ANIONGAP 10 9   EGFRNONAA 50* >60     Lactic Acid:   Recent Labs   Lab 08/18/19  1740   LACTATE 1.8     TSH: No results for input(s): TSH in the last 4320 hours.  Urine Culture: No results for input(s): LABURIN in the last 48 hours.  Urine Studies:   Recent Labs   Lab 08/18/19  1136   COLORU Yellow   APPEARANCEUA Clear   PHUR 6.0   SPECGRAV 1.025   PROTEINUA Negative   GLUCUA 1+*   KETONESU Negative   BILIRUBINUA Negative   OCCULTUA Negative   NITRITE Negative   UROBILINOGEN Negative   LEUKOCYTESUR Negative       Significant Imaging: I have reviewed all pertinent imaging results/findings within the past 24 hours.

## 2019-08-19 NOTE — ED NOTES
Pt sleeping. rr even and unlabored on ra. Nadn. Pt wife at bedside. Cardiac monitoring continued. Call light within reach.

## 2019-08-19 NOTE — ED NOTES
Pt voided large amount of urine into brief. Pt cleaned, changed, and repositioned in bed. Pt wife at bedside.

## 2019-08-19 NOTE — ED NOTES
Pt agitated and restless. Pt pulling cardiac monitor off, attempting to pull IV out. Notified MARYAM Hernandez NP.

## 2019-08-19 NOTE — ASSESSMENT & PLAN NOTE
Nausea & vomiting  Lipase 255. No signs of pancreatitis or biliary/aura problems on CT/US  Abdominal exam benign and nausea resolved and patient is hungry.   PO challenge with clear liquids then progress diet carefully   Gentle IV Fluids  Check labs in morning  Lipase down trending 64 on 8/19

## 2019-08-19 NOTE — PROGRESS NOTES
[facility] Hospital Medicine  Progress Note    Patient Name: Yayo Daniels  MRN: [id number]  Patient Class: OP- Observation   Admission Date: 8/18/2019  Length of Stay: 0 days  Attending Physician: Maryana Santizo*  Primary Care Provider: MARYAM Patrick Jr, MD        Subjective:     Principal Problem:Proctitis        HPI:  Yayo Daniels is a 84 y.o. male who  has a PMhx Diabetes Type 2, CKD 3, Vascular Dementia with gait instability, Squamous cell CA, Hypothyroidism, HLD,  And  Pancytopenia.  He is a resident of [facility], but was recently discharged from a behavior health facility for dementia.      He presented to the ED per EMS due to emesis and weakness. Patient's relative reports that the [facility] reported 3 episodes of emesis, but patient reports no vomiting today, only gagging. Relative states that his appetite is poor and is more lethargic then normal.  The patient denies any abdominal pain or nausea, but is a poor historian due to his dementia. ED workup revealed essentially normal CBC and CMP.  UA negative. His Lipase was 255.  His abdominal exam was benign with no TTP or current nausea and was stating that he was hungry and wants eat.  He had Abd CT with contrast and Abdominal US.  Both negative for pancreatitis.  No concerning findings on US. CT concerning Stercoral proctitis due to Prominent stool ball within the rectum.  Will check a Lactic Acid to r/o ischemia.  He was given a liter IVF in the ED. He will be admitted for observation for bowel care.  If he lactic acid is high will get CTA Abd/Pelvis tomorrow.          Overview/Hospital Course:  Lipase improved, Lactic acid neg, had some BM family did not appreciate the quantity still feels he is impacted- KUB and continue stool softener, plan schedule bowel regimen on discharge, miconazole powder for groin erythema. D/c IVF, possible d/c today      Interval History: awake and alert, not in  any distressed, reported BM yesterday, family did not appreciate the quantity still feels he is impacted. Family reported he tolerated his dinner yesterday. Spouse concern with recurrent constipation. Discussed plan for schedule bowel regimen on discharge.   family also noted groin erythema- miconazole powder.  D/c IVF      Review of Systems   Constitutional: Negative for chills, diaphoresis, fever and unexpected weight change.   Respiratory: Negative for cough.    Cardiovascular: Negative for palpitations.   Gastrointestinal: Negative for constipation and vomiting.   Musculoskeletal: Negative for back pain.   Skin: Positive for rash. Negative for color change and wound.   Neurological: Negative for weakness (Generalized).   Psychiatric/Behavioral: Negative for agitation, confusion and decreased concentration.     Objective:     Vital Signs (Most Recent):  Temp: 97.8 °F (36.6 °C) (08/19/19 0311)  Pulse: 65(Simultaneous filing. User may not have seen previous data.) (08/19/19 0617)  Resp: 14(Simultaneous filing. User may not have seen previous data.) (08/19/19 0617)  BP: (!) 144/67(Simultaneous filing. User may not have seen previous data.) (08/19/19 0617)  SpO2: 95 % (08/19/19 0617) Vital Signs (24h Range):  Temp:  [97.5 °F (36.4 °C)-98.3 °F (36.8 °C)] 97.8 °F (36.6 °C)  Pulse:  [52-91] 65  Resp:  [11-22] 14  SpO2:  [94 %-100 %] 95 %  BP: (109-179)/(56-80) 144/67     Weight: 77.6 kg (171 lb)  Body mass index is 26 kg/m².    Intake/Output Summary (Last 24 hours) at 8/19/2019 0810  Last data filed at 8/18/2019 1740  Gross per 24 hour   Intake 1000 ml   Output --   Net 1000 ml      Physical Exam   Constitutional: He appears well-developed and well-nourished. No distress.   HENT:   Head: Normocephalic and atraumatic.   Neck: Neck supple.   Cardiovascular: Normal rate, regular rhythm, normal heart sounds and intact distal pulses.   No murmur heard.  Pulmonary/Chest: Effort normal and breath sounds normal. No respiratory  distress. He exhibits no tenderness.   Abdominal: Soft. Bowel sounds are normal. He exhibits no distension and no mass. There is no tenderness.   Musculoskeletal: He exhibits no edema.   Neurological: He is alert.   Oriented to person and place. Not oriented to time or situation   Skin: Skin is warm and dry. Capillary refill takes less than 2 seconds. No rash noted. He is not diaphoretic. There is erythema. No pallor.   Mild groin erythema no rash   Psychiatric: He has a normal mood and affect. His behavior is normal.   Nursing note and vitals reviewed.      Significant Labs:   Blood Culture: No results for input(s): LABBLOO in the last 48 hours.  CBC:   Recent Labs   Lab 08/18/19  1036   WBC 8.51   HGB 14.6   HCT 43.0   *     CMP:   Recent Labs   Lab 08/18/19  1036 08/19/19  0610   * 138   K 4.2 3.8   CL 98 104   CO2 27 25   * 181*   BUN 23 17   CREATININE 1.3 1.1   CALCIUM 9.4 8.6*   PROT 6.9  --    ALBUMIN 4.0  --    BILITOT 0.7  --    ALKPHOS 108  --    AST 15  --    ALT 16  --    ANIONGAP 10 9   EGFRNONAA 50* >60     Lactic Acid:   Recent Labs   Lab 08/18/19  1740   LACTATE 1.8     TSH: No results for input(s): TSH in the last 4320 hours.  Urine Culture: No results for input(s): LABURIN in the last 48 hours.  Urine Studies:   Recent Labs   Lab 08/18/19  1136   COLORU Yellow   APPEARANCEUA Clear   PHUR 6.0   SPECGRAV 1.025   PROTEINUA Negative   GLUCUA 1+*   KETONESU Negative   BILIRUBINUA Negative   OCCULTUA Negative   NITRITE Negative   UROBILINOGEN Negative   LEUKOCYTESUR Negative       Significant Imaging: I have reviewed all pertinent imaging results/findings within the past 24 hours.      Assessment/Plan:      * Proctitis  Fecal impaction in rectum  And CT with stercoral proctitis and Mild colonic stool burden elsewhere without evidence of associated bowel obstruction or perforation.  Not on bowel regimen at nursing home.   --Check Lactic Acid to rule out ischemia- wnl  --Soap Suds  Enema followed by po lactulose  --Add Miralax daily    Erythema of groin  Miconazole powder      DNR (do not resuscitate)  Presents with DNR paperwork for [facility] Signed on 3/16/19. Confirmed with Patient's daughter that they would like to continue DNR status.       Elevated lipase  Nausea & vomiting  Lipase 255. No signs of pancreatitis or biliary/aura problems on CT/US  Abdominal exam benign and nausea resolved and patient is hungry.   PO challenge with clear liquids then progress diet carefully   Gentle IV Fluids  Check labs in morning  Lipase down trending 64 on 8/19        Hypothyroidism  Continue home synthroid      CKD stage 3 secondary to diabetes  stable      Vascular dementia, uncomplicated  Patient recently discharged from behavorial health facility for medication adjustment.  Mood stable and pleasant  Continue home medications      Controlled type 2 diabetes mellitus with stage 3 chronic kidney disease, with long-term current use of insulin  Glucose on admit 212, even though not eating.  Takes 70/30 insulin 10 units qam, 5 units qpm  Giving about half dose Detemir 7 units qhs  Check blood glucose AC and HS and use SSI. Diabetic diet, when cleared for Diet. Check A1c.            VTE Risk Mitigation (From admission, onward)        Ordered     IP VTE HIGH RISK PATIENT  Once      08/18/19 2057     Place sequential compression device  Until discontinued      08/18/19 2057                Maryana Santizo MD  Department of Hospital Medicine   [facility]

## 2019-08-19 NOTE — PLAN OF CARE
Problem: Adult Inpatient Plan of Care  Goal: Plan of Care Review  Outcome: Ongoing (interventions implemented as appropriate)  Patient oriented to self only. Bed remains low, locked and call bell within reach. MADALYN ordered and camera in room with patient. Family at the bedside. PT/OT ordered. Will continue to monitor patient.

## 2019-08-19 NOTE — PLAN OF CARE
TN met with pt, wife Rashmi and daughter Silvia Silva  [phone]   pt is a resident of [facility].  Family wants pt to return at d/c -- also -- family wants to transport pt to nursing home in private vehicle at d/c     pt info sent to [facility] per [facility]     dx:  dementia, emesis and weakness          08/19/19 1746   Discharge Assessment   Assessment Type Discharge Planning Assessment   Confirmed/corrected address and phone number on facesheet? Yes   Assessment information obtained from? Caregiver;Medical Record   Expected Length of Stay (days) 2   Communicated expected length of stay with patient/caregiver yes   Prior to hospitilization cognitive status: Unable to Assess   Prior to hospitalization functional status: Completely Dependent   Current cognitive status: Unable to Assess   Current Functional Status: Completely Dependent   Lives With facility resident  ([facility] )   Able to Return to Prior Arrangements yes   Is patient able to care for self after discharge? No   Who are your caregiver(s) and their phone number(s)?   (wife Rashmi;  daughter Silvia Silva  [phone] )   Patient's perception of discharge disposition senior care care facility  ([facility] )   Readmission Within the Last 30 Days no previous admission in last 30 days   Patient currently being followed by outpatient case management? No   Patient currently receives any other outside agency services? No   Do you have any problems affording any of your prescribed medications? No   Is the patient taking medications as prescribed? yes   Does the patient have transportation home? Yes   Transportation Anticipated family or friend will provide  (family wants to transport pt back to NH )   Does the patient receive services at the [facility]? No   Discharge Plan A Return to nursing home   DME Needed Upon Discharge  none   Patient/Family in Agreement with Plan yes

## 2019-08-20 NOTE — PLAN OF CARE
TN met with pt and family prior to d/c   per MD Fadumo houser for family to transport to home per private vehicle       per Ms. Mackey at [facility]  - pt may return to facility -- all orders / info sent per [facility]     Bedside Nurse will call report to facility         08/20/19 1669   Final Note   Assessment Type Final Discharge Note   Anticipated Discharge Disposition group home Nu  ([facility]   )   What phone number can be called within the next 1-3 days to see how you are doing after discharge? [phone]   Hospital Follow Up  Appt(s) scheduled?   (n/a - custodialy care )   Discharge plans and expectations educations in teach back method with documentation complete? Yes   [facility] Referral Info   Post Acute Recommendation No Care   Referral Type   (nursing home )   Facility Name [facility]

## 2019-08-20 NOTE — DISCHARGE SUMMARY
[facility] Hospital Medicine  Discharge Summary      Patient Name: Yayo Daniels  MRN: [id number]  Admission Date: 8/18/2019  Hospital Length of Stay: 0 days  Discharge Date and Time: No discharge date for patient encounter.  Attending Physician: Ramon Stahl DO   Discharging Provider: Ramon Stahl DO  Primary Care Provider: MARYAM Patrick Jr, MD      HPI:   Yayo Daniels is a 84 y.o. male who  has a PMhx Diabetes Type 2, CKD 3, Vascular Dementia with gait instability, Squamous cell CA, Hypothyroidism, HLD,  And  Pancytopenia.  He is a resident of [facility], but was recently discharged from a behavior health facility for dementia.      He presented to the ED per EMS due to emesis and weakness. Patient's relative reports that the [facility] reported 3 episodes of emesis, but patient reports no vomiting today, only gagging. Relative states that his appetite is poor and is more lethargic then normal.  The patient denies any abdominal pain or nausea, but is a poor historian due to his dementia. ED workup revealed essentially normal CBC and CMP.  UA negative. His Lipase was 255.  His abdominal exam was benign with no TTP or current nausea and was stating that he was hungry and wants eat.  He had Abd CT with contrast and Abdominal US.  Both negative for pancreatitis.  No concerning findings on US. CT concerning Stercoral proctitis due to Prominent stool ball within the rectum.  Will check a Lactic Acid to r/o ischemia.  He was given a liter IVF in the ED. He will be admitted for observation for bowel care.  If he lactic acid is high will get CTA Abd/Pelvis tomorrow.          * No surgery found *      Hospital Course:   Lipase improved, Lactic acid neg, had some BM family did not appreciate the quantity still feels he is impacted- KUB and continue stool softener, plan schedule bowel regimen on discharge, miconazole powder for groin erythema. D/c IVF, possible d/c  today  8/20 pt is doing ok, no abd pain, no rectal pain, asking if he can go home today.  Pt kub was unremarkable, [facility]     Consults:     No new Assessment & Plan notes have been filed under this hospital service since the last note was generated.  Service: Hospital Medicine    Final Active Diagnoses:    Diagnosis Date Noted POA    PRINCIPAL PROBLEM:  Proctitis [K62.89] 08/18/2019 Yes    Erythema of groin [L53.9] 08/19/2019 Yes    Elevated lipase [R74.8] 08/18/2019 Yes    Fecal impaction in rectum [K56.41] 08/18/2019 Yes    Nausea & vomiting [R11.2] 08/18/2019 Yes    DNR (do not resuscitate) [Z66] 08/18/2019 Yes    Hypothyroidism [E03.9] 11/14/2018 Yes    CKD stage 3 secondary to diabetes [E11.22, N18.3] 03/13/2017 Yes     Chronic    Vascular dementia, uncomplicated [F01.50] 09/12/2016 Yes     Chronic    Controlled type 2 diabetes mellitus with stage 3 chronic kidney disease, with long-term current use of insulin [E11.22, N18.3, Z79.4] 03/03/2016 Not Applicable     Chronic      Problems Resolved During this Admission:       Discharged Condition: stable    Disposition: Another Health Care Inst*    Follow Up:  Follow-up Information     MARYAM Patrick Jr, MD In 3 days.    Specialty:  Internal Medicine  Contact information:  [address] [place] [address] [id number] [phone]                 Patient Instructions:      Diet diabetic     Diet Cardiac     Notify your health care provider if you experience any of the following:  temperature >100.4     Notify your health care provider if you experience any of the following:  persistent nausea and vomiting or diarrhea     Notify your health care provider if you experience any of the following:  severe uncontrolled pain     Notify your health care provider if you experience any of the following:  difficulty breathing or increased cough     Notify your health care provider if you experience any of the following:  severe persistent headache      Notify your health care provider if you experience any of the following:  worsening rash     Notify your health care provider if you experience any of the following:  persistent dizziness, light-headedness, or visual disturbances     Notify your health care provider if you experience any of the following:  increased confusion or weakness     Activity as tolerated       Significant Diagnostic Studies: Labs:   BMP:   Recent Labs   Lab 08/19/19  0610 08/20/19  0857   * 188*    139   K 3.8 4.4    102   CO2 25 29   BUN 17 16   CREATININE 1.1 1.3   CALCIUM 8.6* 9.3   , CMP   Recent Labs   Lab 08/19/19  0610 08/20/19  0857    139   K 3.8 4.4    102   CO2 25 29   * 188*   BUN 17 16   CREATININE 1.1 1.3   CALCIUM 8.6* 9.3   ANIONGAP 9 8   ESTGFRAFRICA >60 58*   EGFRNONAA >60 50*   , CBC No results for input(s): WBC, HGB, HCT, PLT in the last 48 hours., INR   Lab Results   Component Value Date    INR 1.0 08/18/2019   , Lipid Panel   Lab Results   Component Value Date    CHOL 185 11/09/2018    HDL 68 11/09/2018    LDLCALC 89.6 11/09/2018    TRIG 137 11/09/2018    CHOLHDL 36.8 11/09/2018   , Troponin   Recent Labs   Lab 08/18/19  1200   TROPONINI <0.006    and A1C:   Recent Labs   Lab 08/19/19  0519   HGBA1C 10.0*     Radiology: X-Ray: KUB: X-Ray Abdomen AP 1 View (KUB):   Results for orders placed or performed during the hospital encounter of 08/18/19   X-Ray Abdomen AP 1 View    Narrative    EXAMINATION:  XR ABDOMEN AP 1 VIEW    CLINICAL HISTORY:  constipation;    TECHNIQUE:  AP View(s) of the abdomen was performed.    COMPARISON:  No 08/18/2019 ne    FINDINGS:  Postsurgical changes in the lower lumbar spine.  No significant bowel dilatation identified.  Mild constipation.  No definite free air in the abdomen.  See above      Electronically signed by: Jad Meza MD  Date:    08/19/2019  Time:    12:04       Pending Diagnostic Studies:     None         Medications:  Reconciled Home  Medications:      Medication List      START taking these medications    miconazole NITRATE 2 % 2 % top powder  Commonly known as:  MICOTIN  Apply topically 2 (two) times daily.     polyethylene glycol 17 gram Pwpk  Commonly known as:  GLYCOLAX  Take 17 g by mouth once daily. for 7 days     senna-docusate 8.6-50 mg 8.6-50 mg per tablet  Commonly known as:  PERICOLACE  Take 1 tablet by mouth 2 (two) times daily.        CONTINUE taking these medications    donepezil 10 MG tablet  Commonly known as:  ARICEPT  Take 10 mg by mouth once daily.     ergocalciferol 50,000 unit Cap  Commonly known as:  ERGOCALCIFEROL  Take 50,000 Units by mouth every 7 days.     furosemide 20 MG tablet  Commonly known as:  LASIX  Take 20 mg by mouth once daily.     galantamine 16 MG 24 hr capsule  Commonly known as:  RAZADYNE ER  Take 16 mg by mouth daily with breakfast.     guaiFENesin 600 mg 12 hr tablet  Commonly known as:  MUCINEX  Take 1,200 mg by mouth Daily.     * insulin NPH-insulin regular (70/30) 100 unit/mL (70-30) injection  Commonly known as:  NOVOLIN 70/30  Inject 10 Units into the skin once daily.     * insulin NPH-insulin regular (70/30) 100 unit/mL (70-30) injection  Commonly known as:  NOVOLIN 70/30  Inject 5 Units into the skin every evening.     levothyroxine 88 MCG tablet  Commonly known as:  SYNTHROID  Take 1 tablet (88 mcg total) by mouth once daily.     memantine 5 MG Tab  Commonly known as:  NAMENDA  Take 5 mg by mouth 2 (two) times daily.     potassium chloride 10 MEQ Cpsr  Commonly known as:  MICRO-K  Take 20 mEq by mouth once daily.     pravastatin 40 MG tablet  Commonly known as:  PRAVACHOL  Take 40 mg by mouth once daily.     * QUEtiapine 25 MG Tab  Commonly known as:  SEROQUEL  Take 75 mg by mouth every evening.     * QUEtiapine 25 MG Tab  Commonly known as:  SEROQUEL  Take 50 mg by mouth once daily.     TRUEPLUS LANCETS 33 gauge Misc  Generic drug:  lancets  USE TO TEST BLOOD GLUCOSE ONCE D     TRUETEST TEST  STRIPS Strp  Generic drug:  blood sugar diagnostic  USE TO TAKE BLOOD SUGAR DAILY     zolpidem 5 MG Tab  Commonly known as:  AMBIEN  Take 5 mg by mouth every evening.         * This list has 4 medication(s) that are the same as other medications prescribed for you. Read the directions carefully, and ask your doctor or other care provider to review them with you.                Indwelling Lines/Drains at time of discharge:   Lines/Drains/Airways          None          Time spent on the discharge of patient: 43 minutes  Patient was seen and examined on the date of discharge and determined to be suitable for discharge.         Ramon Stahl DO  Department of Hospital Medicine  [facility]

## 2019-08-20 NOTE — PT/OT/SLP PROGRESS
Physical Therapy Treatment    Patient Name:  Yayo Daniels   MRN:  [id number]    Recommendations:     Discharge Recommendations:  other (see comments)([facility])   Discharge Equipment Recommendations: none   Barriers to discharge: None    Assessment:     Yayo Daniels is a 84 y.o. male admitted with a medical diagnosis of Proctitis.  He presents with the following impairments/functional limitations:  weakness, impaired self care skills, impaired functional mobilty, gait instability, impaired balance, impaired endurance, impaired cognition . Patient able to ambulate with RW and SBA ~ 220 ft, verbal cues for RW management.    Rehab Prognosis: Good; patient would benefit from acute skilled PT services to address these deficits and reach maximum level of function.    Recent Surgery: * No surgery found *      Plan:     During this hospitalization, patient to be seen 5 x/week to address the identified rehab impairments via gait training, therapeutic activities and progress toward the following goals:    · Plan of Care Expires:  09/19/19    Subjective     Chief Complaint: none voiced  Patient/Family Comments/goals: none voiced  Pain/Comfort:  · Pain Rating 1: 0/10  · Pain Rating Post-Intervention 1: 0/10      Objective:     Communicated with primary nurse prior to session.  Patient found HOB elevated with bed alarm upon PT entry to room.     General Precautions: Standard, fall   Orthopedic Precautions:N/A   Braces: N/A     Functional Mobility:  · Bed Mobility:     · Supine to Sit: stand by assistance  · Transfers:     · Sit to Stand:  stand by assistance with no AD  · Gait: 220 ft with RW and SBA   · Balance: fair with RW      AM-PAC 6 CLICK MOBILITY  Turning over in bed (including adjusting bedclothes, sheets and blankets)?: 4  Sitting down on and standing up from a chair with arms (e.g., wheelchair, bedside commode, etc.): 4  Moving from lying on back to sitting on the side of the bed?: 4  Moving to and  from a bed to a chair (including a wheelchair)?: 3  Need to walk in hospital room?: 3  Climbing 3-5 steps with a railing?: 3  Basic Mobility Total Score: 21       Therapeutic Activities and Exercises:   na    Patient left up in chair with all lines intact, call button in reach, chair alarm on and daughter present..    GOALS:   Multidisciplinary Problems     Physical Therapy Goals        Problem: Physical Therapy Goal    Goal Priority Disciplines Outcome Goal Variances Interventions   Physical Therapy Goal     PT, PT/OT Ongoing (interventions implemented as appropriate)     Description:  Goals to be met by: 2019     Patient will increase functional independence with mobility by performin. Supine to sit with Modified [facility]  2. Sit to stand transfer with Modified [facility]  3. Gait  x 150 feet with Supervision using no AD.                       Time Tracking:     PT Received On: 19  PT Start Time: 1100     PT Stop Time: 1120  PT Total Time (min): 20 min     Billable Minutes: Gait Training 20    Treatment Type: Treatment  PT/PTA: PT           Bryon Jackson, PT  2019

## 2019-08-20 NOTE — PLAN OF CARE
Problem: Adult Inpatient Plan of Care  Goal: Plan of Care Review  Outcome: Ongoing (interventions implemented as appropriate)  Patient resting in bed, oriented to self. Wife and daughter at the bedside. Medications administered as ordered. No complaints of pain or discomfort. Patient asleep through the night. Encouraged to call with needs or concerns.Will continue to monitor.

## 2019-08-20 NOTE — PLAN OF CARE
[facility]     Department of Hospital Medicine     [address] [place] [address]     [phone] after hours  [phone] fax       NURSING HOME ORDERS    08/20/2019    Admit to Nursing Home:  Regular Bed       [facility]                                         Diagnoses:  Active Hospital Problems    Diagnosis  POA    *Proctitis [K62.89]  Yes    Erythema of groin [L53.9]  Yes    Elevated lipase [R74.8]  Yes    Fecal impaction in rectum [K56.41]  Yes    Nausea & vomiting [R11.2]  Yes    DNR (do not resuscitate) [Z66]  Yes    Hypothyroidism [E03.9]  Yes    CKD stage 3 secondary to diabetes [E11.22, N18.3]  Yes     Chronic    Vascular dementia, uncomplicated [F01.50]  Yes     Chronic    Controlled type 2 diabetes mellitus with stage 3 chronic kidney disease, with long-term current use of insulin [E11.22, N18.3, Z79.4]  Not Applicable     Chronic      Resolved Hospital Problems   No resolved problems to display.       Patient is homebound due to:  Proctitis    Allergies:  Review of patient's allergies indicates:   Allergen Reactions    Glimepiride Other (See Comments)    Metformin Other (See Comments)    Zocor [simvastatin] Other (See Comments)       Vitals:       Routine, per facility    Diet: 1800 ADA cardiac diet      Acitivities:     As tolerated      LABS:  Per facility protocol    Nursing Precautions:    - Aspiration precautions:             - Total assistance with meals            -  Upright 90 degrees befor during and after meals             -  Suction at bedside          - Fall precautions per nursing home protocol   - Seizure precaution per intermediate protocol   - Decubitus precautions:        -  for positioning   - Pressure reducing foam mattress   - Turn patient every two hours. Use wedge pillows to anchor patient    CONSULTS:     Physical Therapy to evaluate and treat     Occupational Therapy to evaluate and treat     Nutrition  to evaluate and recommend diet                     DIABETES CARE:     Check blood sugar:      Fingerstick blood sugar AC and HS     Report CBG < 60 or > 400 to physician.                                          Insulin Sliding Scale          Glucose  Novolog Insulin Subcutaneous        0 - 60   Orange juice or glucose tablet, hold insulin      No insulin   201-250  2 units   251-300  4 units   301-350  6 units   351-400  8 units   >400   10 units then call physician      Medications: Discontinue all previous medication orders, if any. See new list below.     Jeremiah Yayo Tate   Home Medication Instructions HEATHER:[id number]    Printed on:08/20/19 1214   Medication Information                      donepezil (ARICEPT) 10 MG tablet  Take 10 mg by mouth once daily.             ergocalciferol (ERGOCALCIFEROL) 50,000 unit Cap  Take 50,000 Units by mouth every 7 days.             furosemide (LASIX) 20 MG tablet  Take 20 mg by mouth once daily.             galantamine (RAZADYNE ER) 16 MG 24 hr capsule  Take 16 mg by mouth daily with breakfast.             guaiFENesin (MUCINEX) 600 mg 12 hr tablet  Take 1,200 mg by mouth Daily.             insulin NPH-insulin regular, 70/30, (NOVOLIN 70/30) 100 unit/mL (70-30) injection  Inject 10 Units into the skin once daily.             insulin NPH-insulin regular, 70/30, (NOVOLIN 70/30) 100 unit/mL (70-30) injection  Inject 5 Units into the skin every evening.             levothyroxine (SYNTHROID) 88 MCG tablet  Take 1 tablet (88 mcg total) by mouth once daily.             memantine (NAMENDA) 5 MG Tab  Take 5 mg by mouth 2 (two) times daily.             miconazole NITRATE 2 % (MICOTIN) 2 % top powder  Apply topically 2 (two) times daily.             polyethylene glycol (GLYCOLAX) 17 gram PwPk  Take 17 g by mouth once daily. for 7 days             potassium chloride (MICRO-K) 10 MEQ CpSR  Take 20 mEq by mouth once daily.             pravastatin (PRAVACHOL) 40 MG tablet  Take 40  mg by mouth once daily.             QUEtiapine (SEROQUEL) 25 MG Tab  Take 75 mg by mouth every evening.             QUEtiapine (SEROQUEL) 25 MG Tab  Take 50 mg by mouth once daily.             senna-docusate 8.6-50 mg (PERICOLACE) 8.6-50 mg per tablet  Take 1 tablet by mouth 2 (two) times daily.             TRUEPLUS LANCETS 33 gauge Misc  USE TO TEST BLOOD GLUCOSE ONCE D             TRUETEST TEST STRIPS Strp  USE TO TAKE BLOOD SUGAR DAILY             zolpidem (AMBIEN) 5 MG Tab  Take 5 mg by mouth every evening.                       _________________________________  Ramon Stahl,   08/20/2019

## 2020-01-01 ENCOUNTER — TELEPHONE (OUTPATIENT)
Dept: FAMILY MEDICINE | Facility: CLINIC | Age: 85
End: 2020-01-01

## 2020-01-01 ENCOUNTER — OUTSIDE PLACE OF SERVICE (OUTPATIENT)
Dept: FAMILY MEDICINE | Facility: CLINIC | Age: 85
End: 2020-01-01
Payer: COMMERCIAL

## 2020-01-01 PROCEDURE — 99308 PR NURSING FAC CARE, SUBSEQ, MINOR COMPLIC: ICD-10-PCS | Mod: ,,, | Performed by: FAMILY MEDICINE

## 2020-01-01 PROCEDURE — 99307 PR NURSING FAC CARE, SUBSEQ, IMPROVING: ICD-10-PCS | Mod: ,,, | Performed by: FAMILY MEDICINE

## 2020-01-01 PROCEDURE — 99499 UNLISTED E&M SERVICE: CPT | Mod: ,,, | Performed by: FAMILY MEDICINE

## 2020-01-01 PROCEDURE — 99307 SBSQ NF CARE SF MDM 10: CPT | Mod: ,,, | Performed by: FAMILY MEDICINE

## 2020-01-01 PROCEDURE — 99499 NO LOS: ICD-10-PCS | Mod: ,,, | Performed by: FAMILY MEDICINE

## 2020-01-01 PROCEDURE — 99308 SBSQ NF CARE LOW MDM 20: CPT | Mod: ,,, | Performed by: FAMILY MEDICINE

## 2020-04-01 NOTE — TELEPHONE ENCOUNTER
Spoke with his daughter ms Vega let her know that he has declined since the last time I saw him.  We are pushing fluids and food for him.  Started medication azithromycin and Plaquenil.    Also discussed that decision of the family members were to keep him at the home and not sent into hospital.

## 2020-04-08 NOTE — TELEPHONE ENCOUNTER
Pt with fever, poor appetite, cough, decreased fluid intake.  Gave him IV fluids 500 cc once or twice over the last week for dehydration.    Do not recall if CXR was done    Family declined to have pt sent to hospital for worsening condition last week and opted for at the home only.

## 2022-09-02 NOTE — HOSPITAL COURSE
Lipase improved, Lactic acid neg, had some BM family did not appreciate the quantity still feels he is impacted- KUB and continue stool softener, plan schedule bowel regimen on discharge, miconazole powder for groin erythema. D/c IVF, possible d/c today  8/20 pt is doing ok, no abd pain, no rectal pain, asking if he can go home today.  Pt kub was unremarkable, [facility]   [facility] CVC

## 2023-10-04 NOTE — ASSESSMENT & PLAN NOTE
Fecal impaction in rectum  And CT with stercoral proctitis and Mild colonic stool burden elsewhere without evidence of associated bowel obstruction or perforation.  Not on bowel regimen at nursing home.   --Check Lactic Acid to rule out ischemia- wnl  --Soap Suds Enema followed by po lactulose  --Add Miralax daily   clothing/glasses